# Patient Record
Sex: FEMALE | Race: WHITE | NOT HISPANIC OR LATINO | Employment: FULL TIME | URBAN - METROPOLITAN AREA
[De-identification: names, ages, dates, MRNs, and addresses within clinical notes are randomized per-mention and may not be internally consistent; named-entity substitution may affect disease eponyms.]

---

## 2023-11-14 ENCOUNTER — OFFICE VISIT (OUTPATIENT)
Dept: OBSTETRICS AND GYNECOLOGY | Facility: CLINIC | Age: 38
End: 2023-11-14
Payer: MEDICAID

## 2023-11-14 ENCOUNTER — LAB VISIT (OUTPATIENT)
Dept: LAB | Facility: CLINIC | Age: 38
End: 2023-11-14
Payer: MEDICAID

## 2023-11-14 VITALS
BODY MASS INDEX: 40.32 KG/M2 | DIASTOLIC BLOOD PRESSURE: 88 MMHG | HEIGHT: 59 IN | SYSTOLIC BLOOD PRESSURE: 136 MMHG | WEIGHT: 200 LBS

## 2023-11-14 DIAGNOSIS — N92.0 MENORRHAGIA WITH REGULAR CYCLE: ICD-10-CM

## 2023-11-14 DIAGNOSIS — N83.202 LEFT OVARIAN CYST: Primary | ICD-10-CM

## 2023-11-14 DIAGNOSIS — D50.0 IRON DEFICIENCY ANEMIA DUE TO CHRONIC BLOOD LOSS: ICD-10-CM

## 2023-11-14 DIAGNOSIS — Z12.4 PAP SMEAR FOR CERVICAL CANCER SCREENING: ICD-10-CM

## 2023-11-14 PROCEDURE — 99203 PR OFFICE/OUTPT VISIT, NEW, LEVL III, 30-44 MIN: ICD-10-PCS | Mod: S$GLB,,, | Performed by: OBSTETRICS & GYNECOLOGY

## 2023-11-14 PROCEDURE — 1159F MED LIST DOCD IN RCRD: CPT | Mod: CPTII,S$GLB,, | Performed by: OBSTETRICS & GYNECOLOGY

## 2023-11-14 PROCEDURE — 1159F PR MEDICATION LIST DOCUMENTED IN MEDICAL RECORD: ICD-10-PCS | Mod: CPTII,S$GLB,, | Performed by: OBSTETRICS & GYNECOLOGY

## 2023-11-14 PROCEDURE — 99203 OFFICE O/P NEW LOW 30 MIN: CPT | Mod: S$GLB,,, | Performed by: OBSTETRICS & GYNECOLOGY

## 2023-11-14 PROCEDURE — 3075F PR MOST RECENT SYSTOLIC BLOOD PRESS GE 130-139MM HG: ICD-10-PCS | Mod: CPTII,S$GLB,, | Performed by: OBSTETRICS & GYNECOLOGY

## 2023-11-14 PROCEDURE — 3079F DIAST BP 80-89 MM HG: CPT | Mod: CPTII,S$GLB,, | Performed by: OBSTETRICS & GYNECOLOGY

## 2023-11-14 PROCEDURE — 3008F BODY MASS INDEX DOCD: CPT | Mod: CPTII,S$GLB,, | Performed by: OBSTETRICS & GYNECOLOGY

## 2023-11-14 PROCEDURE — 3075F SYST BP GE 130 - 139MM HG: CPT | Mod: CPTII,S$GLB,, | Performed by: OBSTETRICS & GYNECOLOGY

## 2023-11-14 PROCEDURE — 3079F PR MOST RECENT DIASTOLIC BLOOD PRESSURE 80-89 MM HG: ICD-10-PCS | Mod: CPTII,S$GLB,, | Performed by: OBSTETRICS & GYNECOLOGY

## 2023-11-14 PROCEDURE — 3008F PR BODY MASS INDEX (BMI) DOCUMENTED: ICD-10-PCS | Mod: CPTII,S$GLB,, | Performed by: OBSTETRICS & GYNECOLOGY

## 2023-11-14 PROCEDURE — 1160F PR REVIEW ALL MEDS BY PRESCRIBER/CLIN PHARMACIST DOCUMENTED: ICD-10-PCS | Mod: CPTII,S$GLB,, | Performed by: OBSTETRICS & GYNECOLOGY

## 2023-11-14 PROCEDURE — 88175 CYTOPATH C/V AUTO FLUID REDO: CPT | Performed by: OBSTETRICS & GYNECOLOGY

## 2023-11-14 PROCEDURE — 1160F RVW MEDS BY RX/DR IN RCRD: CPT | Mod: CPTII,S$GLB,, | Performed by: OBSTETRICS & GYNECOLOGY

## 2023-11-14 PROCEDURE — 85025 COMPLETE CBC W/AUTO DIFF WBC: CPT | Performed by: OBSTETRICS & GYNECOLOGY

## 2023-11-14 RX ORDER — NORGESTIMATE AND ETHINYL ESTRADIOL 0.25-0.035
1 KIT ORAL DAILY
Qty: 90 TABLET | Refills: 3 | Status: SHIPPED | OUTPATIENT
Start: 2023-11-14 | End: 2024-02-04 | Stop reason: SDUPTHER

## 2023-11-14 RX ORDER — NAPROXEN 500 MG/1
500 TABLET ORAL
COMMUNITY
Start: 2023-11-11

## 2023-11-14 NOTE — LETTER
December 11, 2023      [facility]  [address]  [place] [address]  Phone: [phone]  Fax: [phone]       Patient: Yenny Willoughby   YOB: 1985  Date of Visit: 12/11/2023    To Whom It May Concern:    SUMANTH Willoughby  was at [facility] on 12/11/2023. The patient may return to work/school on 12/12/2023 with no restrictions. If you have any questions or concerns, or if I can be of further assistance, please do not hesitate to contact me.    Sincerely,    Kalani De Souza LPN

## 2023-11-14 NOTE — PROGRESS NOTES
Yenny Willoughby  complains of  severe pelvic pain, and heavy menstrual cycles    Pelvic pain/left ovarian cyst   She had an ultrasound on 2023 for pelvic pain and was found to have a 5.5 cm left ovarian cyst.  In addition she was found to have a left inguinal and periumbilical hernias    2.  She is also known to be iron-deficiency anemic due to chronic blood loss, heavy menstrual cycles. In fact she had an iron infusion today     Past Medical History:   Diagnosis Date    Anemia     currently taking iron infusions    Asthma     Heavy menstrual bleeding     Hypertension     no meds    Mental disorder     anxiety/depression     Past Surgical History:   Procedure Laterality Date    CARPAL TUNNEL RELEASE Bilateral     TUBAL LIGATION      PPTL     Family History   Problem Relation Age of Onset    Breast cancer Neg Hx     Colon cancer Neg Hx     Ovarian cancer Neg Hx     Uterine cancer Neg Hx      Review of patient's allergies indicates:  No Known Allergies  Social History     Socioeconomic History    Marital status: Single   Tobacco Use    Smoking status: Every Day     Types: Cigarettes    Smokeless tobacco: Never   Substance and Sexual Activity    Alcohol use: Not Currently    Sexual activity: Yes       ROS:   See HPI    Vitals:    23 1352   BP: 136/88     GENERAL: mild distress, crying  NECK: thyroid is normal in size without nodules or tenderness  BREASTS: inspection negative, no nipple discharge or bleeding, no masses or nodularity palpable  ABDOMEN: Abdomen soft, nontender. BS normal. No masses, organomegaly or scars.  GENITALIA: normal external genitalia, no erythema, no discharge  URETHRA: normal appearing urethra with no masses, tenderness or lesions  VAGINA: normal vagina  CERVIX: Normal  UTERUS: normal size  ADNEXA: not evaluated      Yenny was seen today for ovarian cyst.    Diagnoses and all orders for this visit:    Left ovarian cyst  -     US OB/GYN Procedure (Viewpoint); Future  -      norgestimate-ethinyl estradioL (ORTHO-CYCLEN) 0.25-35 mg-mcg per tablet; Take 1 tablet by mouth once daily.    Iron deficiency anemia due to chronic blood loss    Menorrhagia with regular cycle  -     US OB/GYN Procedure (Viewpoint); Future  -     norgestimate-ethinyl estradioL (ORTHO-CYCLEN) 0.25-35 mg-mcg per tablet; Take 1 tablet by mouth once daily.    Pap smear for cervical cancer screening  -     Liquid-Based Pap Smear, Screening; Future  -     Liquid-Based Pap Smear, Screening         Assessment and plan:    1. Left ovarian cyst, 5.5 cm  2. Pelvic pain  3. Menorrhagia with severe iron-deficiency anemia requiring iron infusions    Discussed options for medical management before surgical options are considered  Declines Depo-Provera  Agrees to try OCPs, start now  Check CBC  Follow-up in 6 weeks with pelvic ultrasound

## 2023-11-14 NOTE — LETTER
November 14, 2023      [facility]  [address]  [place] [address]  Phone: [phone]  Fax: [phone]       Patient: Yenny Willoughby   YOB: 1985  Date of Visit: 11/14/2023    To Whom It May Concern:    SUMANTH Willoughby  was at [facility] on 11/14/2023. The patient may return to work/school on 11/14/2023 with restrictions of no lifting, straining or strenuous activities until further notice . If you have any questions or concerns, or if I can be of further assistance, please do not hesitate to contact me.    Sincerely,    CINDY Gatica Dr.

## 2023-11-15 LAB
BASOPHILS # BLD AUTO: 0.02 K/UL (ref 0–0.2)
BASOPHILS NFR BLD: 0.2 % (ref 0–1.9)
DIFFERENTIAL METHOD: ABNORMAL
EOSINOPHIL # BLD AUTO: 0 K/UL (ref 0–0.5)
EOSINOPHIL NFR BLD: 0.4 % (ref 0–8)
ERYTHROCYTE [DISTWIDTH] IN BLOOD BY AUTOMATED COUNT: 14.5 % (ref 11.5–14.5)
HCT VFR BLD AUTO: 34.7 % (ref 37–48.5)
HGB BLD-MCNC: 9.8 G/DL (ref 12–16)
IMM GRANULOCYTES # BLD AUTO: 0.05 K/UL (ref 0–0.04)
IMM GRANULOCYTES NFR BLD AUTO: 0.5 % (ref 0–0.5)
LYMPHOCYTES # BLD AUTO: 1.3 K/UL (ref 1–4.8)
LYMPHOCYTES NFR BLD: 12.5 % (ref 18–48)
MCH RBC QN AUTO: 21.5 PG (ref 27–31)
MCHC RBC AUTO-ENTMCNC: 28.2 G/DL (ref 32–36)
MCV RBC AUTO: 76 FL (ref 82–98)
MONOCYTES # BLD AUTO: 0.6 K/UL (ref 0.3–1)
MONOCYTES NFR BLD: 6.1 % (ref 4–15)
NEUTROPHILS # BLD AUTO: 8.4 K/UL (ref 1.8–7.7)
NEUTROPHILS NFR BLD: 80.3 % (ref 38–73)
NRBC BLD-RTO: 0 /100 WBC
PLATELET # BLD AUTO: 335 K/UL (ref 150–450)
PMV BLD AUTO: 11.2 FL (ref 9.2–12.9)
RBC # BLD AUTO: 4.56 M/UL (ref 4–5.4)
WBC # BLD AUTO: 10.39 K/UL (ref 3.9–12.7)

## 2023-11-15 PROCEDURE — 36415 COLL VENOUS BLD VENIPUNCTURE: CPT | Mod: ,,, | Performed by: STUDENT IN AN ORGANIZED HEALTH CARE EDUCATION/TRAINING PROGRAM

## 2023-11-15 PROCEDURE — 36415 PR COLLECTION VENOUS BLOOD,VENIPUNCTURE: ICD-10-PCS | Mod: ,,, | Performed by: STUDENT IN AN ORGANIZED HEALTH CARE EDUCATION/TRAINING PROGRAM

## 2023-11-22 ENCOUNTER — TELEPHONE (OUTPATIENT)
Dept: OBSTETRICS AND GYNECOLOGY | Facility: CLINIC | Age: 38
End: 2023-11-22
Payer: MEDICAID

## 2023-11-22 NOTE — TELEPHONE ENCOUNTER
----- Message from Charla Langley MD sent at 11/16/2023 12:25 PM CST -----  Continue birth control pills and iron and follow-up as planned for ultrasound

## 2023-11-24 LAB
FINAL PATHOLOGIC DIAGNOSIS: NORMAL
Lab: NORMAL

## 2023-12-11 ENCOUNTER — TELEPHONE (OUTPATIENT)
Dept: OBSTETRICS AND GYNECOLOGY | Facility: CLINIC | Age: 38
End: 2023-12-11
Payer: MEDICAID

## 2023-12-11 NOTE — TELEPHONE ENCOUNTER
----- Message from Lucy Devine sent at 12/11/2023  2:17 PM CST -----  Contact: PT  Type:  Needs Medical Advice    Who Called: PT   Symptoms (please be specific): PT NEEDS A CALL BACK TO DISCUSS LIFTING HER RESTRICTION SO THAT SHE CAN GO BACK TO WORK.   How long has patient had these symptoms:  A FEW WEEKS   Pharmacy name and phone #:    [id number] - [place], [place] - [address]  [address]  [place] [address]  Phone: [phone] Fax: [phone]  Would the patient rather a call back or a response via [facility]? CALL   Best Call Back Number: [phone]  Additional Information: THANK YOU

## 2023-12-19 ENCOUNTER — PROCEDURE VISIT (OUTPATIENT)
Dept: OBSTETRICS AND GYNECOLOGY | Facility: CLINIC | Age: 38
End: 2023-12-19
Payer: MEDICAID

## 2023-12-19 ENCOUNTER — OFFICE VISIT (OUTPATIENT)
Dept: OBSTETRICS AND GYNECOLOGY | Facility: CLINIC | Age: 38
End: 2023-12-19
Payer: MEDICAID

## 2023-12-19 ENCOUNTER — LAB VISIT (OUTPATIENT)
Dept: LAB | Facility: CLINIC | Age: 38
End: 2023-12-19
Payer: MEDICAID

## 2023-12-19 DIAGNOSIS — N83.201 RIGHT OVARIAN CYST: ICD-10-CM

## 2023-12-19 DIAGNOSIS — N83.202 LEFT OVARIAN CYST: ICD-10-CM

## 2023-12-19 DIAGNOSIS — N92.0 MENORRHAGIA WITH REGULAR CYCLE: ICD-10-CM

## 2023-12-19 DIAGNOSIS — D50.0 IRON DEFICIENCY ANEMIA DUE TO CHRONIC BLOOD LOSS: ICD-10-CM

## 2023-12-19 DIAGNOSIS — N92.0 MENORRHAGIA WITH REGULAR CYCLE: Primary | ICD-10-CM

## 2023-12-19 LAB
BASOPHILS # BLD AUTO: 0.02 K/UL (ref 0–0.2)
BASOPHILS NFR BLD: 0.2 % (ref 0–1.9)
DIFFERENTIAL METHOD: ABNORMAL
EOSINOPHIL # BLD AUTO: 0.1 K/UL (ref 0–0.5)
EOSINOPHIL NFR BLD: 0.8 % (ref 0–8)
ERYTHROCYTE [DISTWIDTH] IN BLOOD BY AUTOMATED COUNT: 19.3 % (ref 11.5–14.5)
HCT VFR BLD AUTO: 40 % (ref 37–48.5)
HGB BLD-MCNC: 12.1 G/DL (ref 12–16)
IMM GRANULOCYTES # BLD AUTO: 0.04 K/UL (ref 0–0.04)
IMM GRANULOCYTES NFR BLD AUTO: 0.4 % (ref 0–0.5)
LYMPHOCYTES # BLD AUTO: 1.7 K/UL (ref 1–4.8)
LYMPHOCYTES NFR BLD: 19.2 % (ref 18–48)
MCH RBC QN AUTO: 24.6 PG (ref 27–31)
MCHC RBC AUTO-ENTMCNC: 30.3 G/DL (ref 32–36)
MCV RBC AUTO: 82 FL (ref 82–98)
MONOCYTES # BLD AUTO: 0.5 K/UL (ref 0.3–1)
MONOCYTES NFR BLD: 5.1 % (ref 4–15)
NEUTROPHILS # BLD AUTO: 6.7 K/UL (ref 1.8–7.7)
NEUTROPHILS NFR BLD: 74.3 % (ref 38–73)
NRBC BLD-RTO: 0 /100 WBC
PLATELET # BLD AUTO: 271 K/UL (ref 150–450)
PMV BLD AUTO: 10.8 FL (ref 9.2–12.9)
RBC # BLD AUTO: 4.91 M/UL (ref 4–5.4)
WBC # BLD AUTO: 8.99 K/UL (ref 3.9–12.7)

## 2023-12-19 PROCEDURE — 76856 US OB/GYN PROCEDURE (VIEWPOINT): ICD-10-PCS | Mod: S$GLB,,, | Performed by: OBSTETRICS & GYNECOLOGY

## 2023-12-19 PROCEDURE — 1160F RVW MEDS BY RX/DR IN RCRD: CPT | Mod: CPTII,S$GLB,, | Performed by: OBSTETRICS & GYNECOLOGY

## 2023-12-19 PROCEDURE — 36415 PR COLLECTION VENOUS BLOOD,VENIPUNCTURE: ICD-10-PCS | Mod: ,,, | Performed by: OBSTETRICS & GYNECOLOGY

## 2023-12-19 PROCEDURE — 76856 US EXAM PELVIC COMPLETE: CPT | Mod: S$GLB,,, | Performed by: OBSTETRICS & GYNECOLOGY

## 2023-12-19 PROCEDURE — 36415 COLL VENOUS BLD VENIPUNCTURE: CPT | Mod: ,,, | Performed by: OBSTETRICS & GYNECOLOGY

## 2023-12-19 PROCEDURE — 1159F MED LIST DOCD IN RCRD: CPT | Mod: CPTII,S$GLB,, | Performed by: OBSTETRICS & GYNECOLOGY

## 2023-12-19 PROCEDURE — 1160F PR REVIEW ALL MEDS BY PRESCRIBER/CLIN PHARMACIST DOCUMENTED: ICD-10-PCS | Mod: CPTII,S$GLB,, | Performed by: OBSTETRICS & GYNECOLOGY

## 2023-12-19 PROCEDURE — 99214 OFFICE O/P EST MOD 30 MIN: CPT | Mod: S$GLB,,, | Performed by: OBSTETRICS & GYNECOLOGY

## 2023-12-19 PROCEDURE — 99214 PR OFFICE/OUTPT VISIT, EST, LEVL IV, 30-39 MIN: ICD-10-PCS | Mod: S$GLB,,, | Performed by: OBSTETRICS & GYNECOLOGY

## 2023-12-19 PROCEDURE — 1159F PR MEDICATION LIST DOCUMENTED IN MEDICAL RECORD: ICD-10-PCS | Mod: CPTII,S$GLB,, | Performed by: OBSTETRICS & GYNECOLOGY

## 2023-12-19 PROCEDURE — 85025 COMPLETE CBC W/AUTO DIFF WBC: CPT | Performed by: OBSTETRICS & GYNECOLOGY

## 2023-12-19 NOTE — PROGRESS NOTES
Yenny Willoughby   is here for follow-up.  She was seen 1 month ago  Yenny Willoughby  complains of  severe pelvic pain, and heavy menstrual cycles     Pelvic pain/left ovarian cyst   She had an ultrasound on 2023 for pelvic pain and was found to have a 5.5 cm left ovarian cyst.  In addition she was found to have a left inguinal and periumbilical hernias     2.  She is also known to be iron-deficiency anemic due to chronic blood loss, heavy menstrual cycles.  She receives iron infusions for her anemia    She was started on OCPs 6 weeks ago to try to control her bleeding and to help the cyst resolved.  She states that her periods are better, less heavy unless crampy    Past Medical History:   Diagnosis Date    Anemia     currently taking iron infusions    Asthma     Heavy menstrual bleeding     Hypertension     no meds    Mental disorder     anxiety/depression     Past Surgical History:   Procedure Laterality Date    CARPAL TUNNEL RELEASE Bilateral     TUBAL LIGATION      PPTL     Family History   Problem Relation Age of Onset    Breast cancer Neg Hx     Colon cancer Neg Hx     Ovarian cancer Neg Hx     Uterine cancer Neg Hx      Review of patient's allergies indicates:  No Known Allergies  Social History     Socioeconomic History    Marital status: Single   Tobacco Use    Smoking status: Every Day     Types: Cigarettes    Smokeless tobacco: Never   Substance and Sexual Activity    Alcohol use: Not Currently    Sexual activity: Yes       ROS:   See HPI    There were no vitals filed for this visit.     Ultrasound today shows the uterus to be 10 cm in length, with an endometrial thickness of 1 cm.  Both ovaries are normal.  The previous left ovarian cyst has resolved    Yenny was seen today for follow-up.    Diagnoses and all orders for this visit:    Menorrhagia with regular cycle    Iron deficiency anemia due to chronic blood loss  -     CBC Auto Differential; Future    Right ovarian cyst          Assessment and plan:  Resolution of ovarian cyst  Improvement of menorrhagia and dysmenorrhea on OCPs  Continue OCPs and follow-up in 3 months  CBC today

## 2023-12-20 ENCOUNTER — TELEPHONE (OUTPATIENT)
Dept: OBSTETRICS AND GYNECOLOGY | Facility: CLINIC | Age: 38
End: 2023-12-20
Payer: MEDICAID

## 2023-12-20 NOTE — TELEPHONE ENCOUNTER
----- Message from Charla Langley MD sent at 12/19/2023  4:02 PM CST -----  Her blood count is much better.  Continue current treatment with OCPs and follow-up in 3 months

## 2023-12-27 ENCOUNTER — TELEPHONE (OUTPATIENT)
Dept: OBSTETRICS AND GYNECOLOGY | Facility: CLINIC | Age: 38
End: 2023-12-27
Payer: MEDICAID

## 2023-12-27 NOTE — TELEPHONE ENCOUNTER
----- Message from Johanna Glez sent at 12/27/2023 10:23 AM CST -----  Contact: PT  Type: Needs Medical Advice    Who Called: PT  Best Call Back Number: [phone]  Additional  Information: PT requesting a call back have some questions regarding irregular cycle started and she started birth control 2 months ago  Please Advise- Thank you

## 2023-12-29 ENCOUNTER — TELEPHONE (OUTPATIENT)
Dept: OBSTETRICS AND GYNECOLOGY | Facility: CLINIC | Age: 38
End: 2023-12-29
Payer: MEDICAID

## 2023-12-29 NOTE — TELEPHONE ENCOUNTER
----- Message from Jennifer Anne sent at 12/29/2023  9:53 AM CST -----  Contact: pt  Type: Needs Medical Advice         Who Called: pt  Best Call Back Number:[phone]  Additional Information: Requesting a call back regarding  pt returned your call asking for  call back please   Please Advise- Thank you

## 2024-02-04 DIAGNOSIS — N92.0 MENORRHAGIA WITH REGULAR CYCLE: ICD-10-CM

## 2024-02-04 DIAGNOSIS — N83.202 LEFT OVARIAN CYST: ICD-10-CM

## 2024-02-05 RX ORDER — NORGESTIMATE AND ETHINYL ESTRADIOL 0.25-0.035
1 KIT ORAL DAILY
Qty: 90 TABLET | Refills: 3 | Status: SHIPPED | OUTPATIENT
Start: 2024-02-05 | End: 2025-02-04

## 2024-04-09 ENCOUNTER — TELEPHONE (OUTPATIENT)
Dept: OBSTETRICS AND GYNECOLOGY | Facility: CLINIC | Age: 39
End: 2024-04-09

## 2024-04-09 NOTE — TELEPHONE ENCOUNTER
----- Message from Lucy Nilson sent at 4/9/2024 12:42 PM CDT -----  Contact: PT  Type: SWITCH   APPT  TYPE    Who Called: PT   Would the patient rather a call back or a response via [facility]? CALL   Best Call Back Number:  [phone]  Additional Information: PT HAS A 1PM APPT TODAY BUT IS UNABLE TO COME IN   PT WOULD LIKE TO KNOW IF THE APPT CAN BE SWITCH TO A TELEHEALTH APPT  PT DOES NOT KNOW HOW TO LOGIN FOR A VIRTUAL  PLEASE CALL TO CONFIRM   THANK YOU

## 2024-09-10 ENCOUNTER — OFFICE VISIT (OUTPATIENT)
Dept: OBSTETRICS AND GYNECOLOGY | Facility: CLINIC | Age: 39
End: 2024-09-10
Payer: MEDICAID

## 2024-09-10 VITALS
SYSTOLIC BLOOD PRESSURE: 126 MMHG | BODY MASS INDEX: 37.5 KG/M2 | DIASTOLIC BLOOD PRESSURE: 82 MMHG | WEIGHT: 186 LBS | HEIGHT: 59 IN

## 2024-09-10 DIAGNOSIS — N93.9 ABNORMAL UTERINE BLEEDING: Primary | ICD-10-CM

## 2024-09-10 DIAGNOSIS — N92.0 MENORRHAGIA WITH REGULAR CYCLE: ICD-10-CM

## 2024-09-10 DIAGNOSIS — N92.1 BREAKTHROUGH BLEEDING ON BIRTH CONTROL PILLS: ICD-10-CM

## 2024-09-10 PROCEDURE — 3074F SYST BP LT 130 MM HG: CPT | Mod: CPTII,,, | Performed by: OBSTETRICS & GYNECOLOGY

## 2024-09-10 PROCEDURE — 99999 PR PBB SHADOW E&M-EST. PATIENT-LVL III: CPT | Mod: PBBFAC,,, | Performed by: OBSTETRICS & GYNECOLOGY

## 2024-09-10 PROCEDURE — 99214 OFFICE O/P EST MOD 30 MIN: CPT | Mod: S$PBB,,, | Performed by: OBSTETRICS & GYNECOLOGY

## 2024-09-10 PROCEDURE — 4010F ACE/ARB THERAPY RXD/TAKEN: CPT | Mod: CPTII,,, | Performed by: OBSTETRICS & GYNECOLOGY

## 2024-09-10 PROCEDURE — 3079F DIAST BP 80-89 MM HG: CPT | Mod: CPTII,,, | Performed by: OBSTETRICS & GYNECOLOGY

## 2024-09-10 PROCEDURE — 99213 OFFICE O/P EST LOW 20 MIN: CPT | Mod: PBBFAC | Performed by: OBSTETRICS & GYNECOLOGY

## 2024-09-10 PROCEDURE — 3008F BODY MASS INDEX DOCD: CPT | Mod: CPTII,,, | Performed by: OBSTETRICS & GYNECOLOGY

## 2024-09-10 PROCEDURE — 1159F MED LIST DOCD IN RCRD: CPT | Mod: CPTII,,, | Performed by: OBSTETRICS & GYNECOLOGY

## 2024-09-10 RX ORDER — BUPROPION HYDROCHLORIDE 150 MG/1
150 TABLET ORAL
COMMUNITY
Start: 2024-08-08

## 2024-09-10 RX ORDER — ALBUTEROL SULFATE 90 UG/1
2 INHALANT RESPIRATORY (INHALATION) EVERY 6 HOURS PRN
COMMUNITY
Start: 2024-03-21

## 2024-09-10 RX ORDER — LISINOPRIL 10 MG/1
10 TABLET ORAL
COMMUNITY

## 2024-09-10 NOTE — H&P (VIEW-ONLY)
Gynecology Preoperative Exam  History & Physical      SUBJECTIVE:     History of Present Illness:  Patient is a 39 y.o. female presents complaining of continued menorrhagia despite taking OCPs.  He is interested in endometrial ablation.  She is status post tubal ligation.    Chief Complaint   Patient presents with    Bleeding/Bruising       Review of patient's allergies indicates:   Allergen Reactions    Prednisone Hives       Current Outpatient Medications   Medication Sig Dispense Refill    albuterol (PROVENTIL/VENTOLIN HFA) 90 mcg/actuation inhaler Inhale 2 puffs into the lungs every 6 (six) hours as needed.      buPROPion (WELLBUTRIN XL) 150 MG TB24 tablet Take 150 mg by mouth.      lisinopriL 10 MG tablet Take 10 mg by mouth.      norgestimate-ethinyl estradioL (ORTHO-CYCLEN) 0.25-35 mg-mcg per tablet Take 1 tablet by mouth once daily. 90 tablet 3    naproxen (NAPROSYN) 500 MG tablet Take 500 mg by mouth. (Patient not taking: Reported on 9/10/2024)       No current facility-administered medications for this visit.     OB History          6    Para   5    Term   5            AB   1    Living   5         SAB   1    IAB        Ectopic        Multiple        Live Births                 Patient's last menstrual period was 08/10/2024.      Past Medical History:   Diagnosis Date    Anemia     currently taking iron infusions    Asthma     Heavy menstrual bleeding     Hypertension     no meds    Mental disorder     anxiety/depression     Past Surgical History:   Procedure Laterality Date    CARPAL TUNNEL RELEASE Bilateral     TUBAL LIGATION      PPTL     Family History   Problem Relation Name Age of Onset    Breast cancer Neg Hx      Colon cancer Neg Hx      Ovarian cancer Neg Hx      Uterine cancer Neg Hx       Social History     Tobacco Use    Smoking status: Every Day     Types: Cigarettes    Smokeless tobacco: Never   Substance Use Topics    Alcohol use: Not Currently        OBJECTIVE:     Vital Signs  "(Most Recent)  BP: 126/82 (09/10/24 1311)  4' 11" (1.499 m)  84.4 kg (186 lb)     Physical Exam:  Physical Exam  Vitals reviewed.   Constitutional:       Appearance: Normal appearance.   HENT:      Head: Normocephalic and atraumatic.   Pulmonary:      Effort: Pulmonary effort is normal.   Neurological:      General: No focal deficit present.      Mental Status: She is alert and oriented to person, place, and time.   Psychiatric:         Mood and Affect: Mood normal.         Behavior: Behavior normal.           Labs:   11/7/2023 TSH 1.5  2/15/2024 Hgb 12.3    Pelvic ultrasound performed on 12/19/2023:  Uterus measures 10.4 x 5.4 x 4.6 cm  Endometrial thickness measures 11 mm  Normal-appearing bilateral ovaries    ASSESSMENT/PLAN:       ICD-10-CM ICD-9-CM   1. Abnormal uterine bleeding  N93.9 626.9   2. Menorrhagia with regular cycle  N92.0 626.2   3. Breakthrough bleeding on birth control pills  N92.1 626.6       PLAN:    --Patient has failed medical management of menorrhagia.  --Discussed further treatment options.  She is interested in endometrial ablation.  She is status post bilateral tubal ligation.  --Brochure for NovaSure endometrial ablation given to patient.  --Reviewed the risks, benefits, alternatives, and indications for this procedure including but not limited to pain, bleeding, infection, damage to surrounding organs, 1/100 risk of uterine perforation and need to abort procedure.  --Consent reviewed and signed with patient today.  --Procedure scheduled for October 1.  --Follow up in the office four weeks post op.      Alisia Collins MD, FACOG   Gynecology    [address] [address] [phone]      "

## 2024-09-10 NOTE — PROGRESS NOTES
Gynecology Preoperative Exam  History & Physical      SUBJECTIVE:     History of Present Illness:  Patient is a 39 y.o. female presents complaining of continued menorrhagia despite taking OCPs.  He is interested in endometrial ablation.  She is status post tubal ligation.    Chief Complaint   Patient presents with    Bleeding/Bruising       Review of patient's allergies indicates:   Allergen Reactions    Prednisone Hives       Current Outpatient Medications   Medication Sig Dispense Refill    albuterol (PROVENTIL/VENTOLIN HFA) 90 mcg/actuation inhaler Inhale 2 puffs into the lungs every 6 (six) hours as needed.      buPROPion (WELLBUTRIN XL) 150 MG TB24 tablet Take 150 mg by mouth.      lisinopriL 10 MG tablet Take 10 mg by mouth.      norgestimate-ethinyl estradioL (ORTHO-CYCLEN) 0.25-35 mg-mcg per tablet Take 1 tablet by mouth once daily. 90 tablet 3    naproxen (NAPROSYN) 500 MG tablet Take 500 mg by mouth. (Patient not taking: Reported on 9/10/2024)       No current facility-administered medications for this visit.     OB History          6    Para   5    Term   5            AB   1    Living   5         SAB   1    IAB        Ectopic        Multiple        Live Births                 Patient's last menstrual period was 08/10/2024.      Past Medical History:   Diagnosis Date    Anemia     currently taking iron infusions    Asthma     Heavy menstrual bleeding     Hypertension     no meds    Mental disorder     anxiety/depression     Past Surgical History:   Procedure Laterality Date    CARPAL TUNNEL RELEASE Bilateral     TUBAL LIGATION      PPTL     Family History   Problem Relation Name Age of Onset    Breast cancer Neg Hx      Colon cancer Neg Hx      Ovarian cancer Neg Hx      Uterine cancer Neg Hx       Social History     Tobacco Use    Smoking status: Every Day     Types: Cigarettes    Smokeless tobacco: Never   Substance Use Topics    Alcohol use: Not Currently        OBJECTIVE:     Vital Signs  "(Most Recent)  BP: 126/82 (09/10/24 1311)  4' 11" (1.499 m)  84.4 kg (186 lb)     Physical Exam:  Physical Exam  Vitals reviewed.   Constitutional:       Appearance: Normal appearance.   HENT:      Head: Normocephalic and atraumatic.   Pulmonary:      Effort: Pulmonary effort is normal.   Neurological:      General: No focal deficit present.      Mental Status: She is alert and oriented to person, place, and time.   Psychiatric:         Mood and Affect: Mood normal.         Behavior: Behavior normal.           Labs:   11/7/2023 TSH 1.5  2/15/2024 Hgb 12.3    Pelvic ultrasound performed on 12/19/2023:  Uterus measures 10.4 x 5.4 x 4.6 cm  Endometrial thickness measures 11 mm  Normal-appearing bilateral ovaries    ASSESSMENT/PLAN:       ICD-10-CM ICD-9-CM   1. Abnormal uterine bleeding  N93.9 626.9   2. Menorrhagia with regular cycle  N92.0 626.2   3. Breakthrough bleeding on birth control pills  N92.1 626.6       PLAN:    --Patient has failed medical management of menorrhagia.  --Discussed further treatment options.  She is interested in endometrial ablation.  She is status post bilateral tubal ligation.  --Brochure for NovaSure endometrial ablation given to patient.  --Reviewed the risks, benefits, alternatives, and indications for this procedure including but not limited to pain, bleeding, infection, damage to surrounding organs, 1/100 risk of uterine perforation and need to abort procedure.  --Consent reviewed and signed with patient today.  --Procedure scheduled for October 1.  --Follow up in the office four weeks post op.      Alisia Collins MD, FACOG   Gynecology    [address] [address] [phone]      "

## 2024-09-12 ENCOUNTER — ANESTHESIA EVENT (OUTPATIENT)
Dept: SURGERY | Facility: HOSPITAL | Age: 39
End: 2024-09-12
Payer: MEDICAID

## 2024-09-12 NOTE — ANESTHESIA PREPROCEDURE EVALUATION
2024  Yenny Willoughby is a 39 y.o., female.      Pre-op Assessment    I have reviewed the Patient Summary Reports.     I have reviewed the Nursing Notes. I have reviewed the NPO Status.   I have reviewed the Medications.     Review of Systems  Anesthesia Hx:  No problems with previous Anesthesia  PP BTL  CTR-R           Denies Family Hx of Anesthesia complications.    Denies Personal Hx of Anesthesia complications.                    Social:  Smoker Outside record   ppd x 20      Hematology/Oncology:       -- Anemia (h/o anemia, on Fe infusions.):               Hematology Comments: Hct 39.8  PMB                    Cardiovascular:     Hypertension              ECG has been reviewed. No meds  EK   otherwise NL.  No change old EKGs  outside record                         Renal/:  Renal/ Normal                 Hepatic/GI:  Hepatic/GI Normal                 Musculoskeletal:  Musculoskeletal Normal                Neurological:  Neurology Normal                                      Endocrine:  Endocrine Normal    BMI 38  On semiglutide        Dermatological:  Skin Normal    Psych:  Psychiatric History anxiety               Physical Exam  General: Well nourished, Cooperative and Alert    Airway:  Mallampati: II   Mouth Opening: Small, but > 3cm  TM Distance: 4 - 6 cm  Tongue: Normal  Neck ROM: Normal ROM    Dental:  Intact    Chest/Lungs:  Clear to auscultation    Heart:  Rate: Normal  Rhythm: Regular Rhythm    Anesthesia Plan  Type of Anesthesia, risks & benefits discussed:    Anesthesia Type: Gen Supraglottic Airway  Intra-op Monitoring Plan: Standard ASA Monitors  Post Op Pain Control Plan: multimodal analgesia  Induction:  IV  Informed Consent: Informed consent signed with the Patient and all parties understand the risks and agree with anesthesia plan.  All questions answered.   ASA Score:  3  Day of Surgery Review of History & Physical: H&P Update referred to the surgeon/provider.    Ready For Surgery From Anesthesia Perspective.   .

## 2024-09-30 ENCOUNTER — LAB VISIT (OUTPATIENT)
Dept: LAB | Facility: HOSPITAL | Age: 39
End: 2024-09-30
Attending: OBSTETRICS & GYNECOLOGY
Payer: MEDICAID

## 2024-09-30 DIAGNOSIS — N92.1 BREAKTHROUGH BLEEDING ON BIRTH CONTROL PILLS: ICD-10-CM

## 2024-09-30 DIAGNOSIS — N93.9 ABNORMAL UTERINE BLEEDING: ICD-10-CM

## 2024-09-30 DIAGNOSIS — N92.0 MENORRHAGIA WITH REGULAR CYCLE: ICD-10-CM

## 2024-09-30 LAB
ANION GAP SERPL CALC-SCNC: 11 MMOL/L (ref 8–16)
BUN SERPL-MCNC: 12 MG/DL (ref 6–20)
CALCIUM SERPL-MCNC: 9.5 MG/DL (ref 8.7–10.5)
CHLORIDE SERPL-SCNC: 107 MMOL/L (ref 95–110)
CO2 SERPL-SCNC: 24 MMOL/L (ref 23–29)
CREAT SERPL-MCNC: 0.9 MG/DL (ref 0.5–1.4)
ERYTHROCYTE [DISTWIDTH] IN BLOOD BY AUTOMATED COUNT: 12.9 % (ref 11.5–14.5)
EST. GFR  (NO RACE VARIABLE): >60 ML/MIN/1.73 M^2
GLUCOSE SERPL-MCNC: 101 MG/DL (ref 70–110)
HCT VFR BLD AUTO: 42.7 % (ref 37–48.5)
HGB BLD-MCNC: 13.4 G/DL (ref 12–16)
MCH RBC QN AUTO: 28.1 PG (ref 27–31)
MCHC RBC AUTO-ENTMCNC: 31.4 G/DL (ref 32–36)
MCV RBC AUTO: 90 FL (ref 82–98)
PLATELET # BLD AUTO: 301 K/UL (ref 150–450)
PMV BLD AUTO: 10.4 FL (ref 9.2–12.9)
POTASSIUM SERPL-SCNC: 4.1 MMOL/L (ref 3.5–5.1)
RBC # BLD AUTO: 4.77 M/UL (ref 4–5.4)
SODIUM SERPL-SCNC: 142 MMOL/L (ref 136–145)
WBC # BLD AUTO: 9.27 K/UL (ref 3.9–12.7)

## 2024-09-30 PROCEDURE — 80048 BASIC METABOLIC PNL TOTAL CA: CPT | Performed by: OBSTETRICS & GYNECOLOGY

## 2024-09-30 PROCEDURE — 36415 COLL VENOUS BLD VENIPUNCTURE: CPT | Performed by: OBSTETRICS & GYNECOLOGY

## 2024-09-30 PROCEDURE — 85027 COMPLETE CBC AUTOMATED: CPT | Performed by: OBSTETRICS & GYNECOLOGY

## 2024-10-01 ENCOUNTER — HOSPITAL ENCOUNTER (OUTPATIENT)
Facility: HOSPITAL | Age: 39
Discharge: HOME | End: 2024-10-01
Attending: OBSTETRICS & GYNECOLOGY | Admitting: OBSTETRICS & GYNECOLOGY
Payer: MEDICAID

## 2024-10-01 ENCOUNTER — ANESTHESIA (OUTPATIENT)
Dept: SURGERY | Facility: HOSPITAL | Age: 39
End: 2024-10-01
Payer: MEDICAID

## 2024-10-01 VITALS
HEIGHT: 59 IN | SYSTOLIC BLOOD PRESSURE: 111 MMHG | TEMPERATURE: 98 F | WEIGHT: 186 LBS | RESPIRATION RATE: 16 BRPM | OXYGEN SATURATION: 97 % | BODY MASS INDEX: 37.5 KG/M2 | HEART RATE: 88 BPM | DIASTOLIC BLOOD PRESSURE: 72 MMHG

## 2024-10-01 DIAGNOSIS — Z98.890 S/P ENDOMETRIAL ABLATION: Primary | ICD-10-CM

## 2024-10-01 PROCEDURE — 58563 HYSTEROSCOPY ABLATION: CPT | Mod: ,,, | Performed by: OBSTETRICS & GYNECOLOGY

## 2024-10-01 PROCEDURE — 63600175 PHARM REV CODE 636 W HCPCS: Performed by: OBSTETRICS & GYNECOLOGY

## 2024-10-01 PROCEDURE — 88305 TISSUE EXAM BY PATHOLOGIST: CPT | Mod: 26,,, | Performed by: PATHOLOGY

## 2024-10-01 PROCEDURE — 36000707: Performed by: OBSTETRICS & GYNECOLOGY

## 2024-10-01 PROCEDURE — 63600175 PHARM REV CODE 636 W HCPCS: Mod: UD | Performed by: NURSE ANESTHETIST, CERTIFIED REGISTERED

## 2024-10-01 PROCEDURE — 27201423 OPTIME MED/SURG SUP & DEVICES STERILE SUPPLY: Performed by: OBSTETRICS & GYNECOLOGY

## 2024-10-01 PROCEDURE — 71000033 HC RECOVERY, INTIAL HOUR: Performed by: OBSTETRICS & GYNECOLOGY

## 2024-10-01 PROCEDURE — 36000706: Performed by: OBSTETRICS & GYNECOLOGY

## 2024-10-01 PROCEDURE — 37000008 HC ANESTHESIA 1ST 15 MINUTES: Performed by: OBSTETRICS & GYNECOLOGY

## 2024-10-01 PROCEDURE — 63600175 PHARM REV CODE 636 W HCPCS: Performed by: ANESTHESIOLOGY

## 2024-10-01 PROCEDURE — 88305 TISSUE EXAM BY PATHOLOGIST: CPT | Performed by: PATHOLOGY

## 2024-10-01 PROCEDURE — 37000009 HC ANESTHESIA EA ADD 15 MINS: Performed by: OBSTETRICS & GYNECOLOGY

## 2024-10-01 PROCEDURE — 25000242 PHARM REV CODE 250 ALT 637 W/ HCPCS: Performed by: NURSE ANESTHETIST, CERTIFIED REGISTERED

## 2024-10-01 PROCEDURE — 71000015 HC POSTOP RECOV 1ST HR: Performed by: OBSTETRICS & GYNECOLOGY

## 2024-10-01 RX ORDER — IBUPROFEN 800 MG/1
800 TABLET ORAL EVERY 8 HOURS PRN
Qty: 60 TABLET | Refills: 2 | Status: SHIPPED | OUTPATIENT
Start: 2024-10-01 | End: 2025-10-01

## 2024-10-01 RX ORDER — ACETAMINOPHEN 10 MG/ML
INJECTION, SOLUTION INTRAVENOUS
Status: DISCONTINUED | OUTPATIENT
Start: 2024-10-01 | End: 2024-10-01

## 2024-10-01 RX ORDER — LIDOCAINE HYDROCHLORIDE 20 MG/ML
INJECTION, SOLUTION EPIDURAL; INFILTRATION; INTRACAUDAL; PERINEURAL
Status: DISCONTINUED | OUTPATIENT
Start: 2024-10-01 | End: 2024-10-01

## 2024-10-01 RX ORDER — ONDANSETRON HYDROCHLORIDE 2 MG/ML
INJECTION, SOLUTION INTRAVENOUS
Status: DISCONTINUED | OUTPATIENT
Start: 2024-10-01 | End: 2024-10-01

## 2024-10-01 RX ORDER — MIDAZOLAM HYDROCHLORIDE 1 MG/ML
INJECTION INTRAMUSCULAR; INTRAVENOUS
Status: DISCONTINUED | OUTPATIENT
Start: 2024-10-01 | End: 2024-10-01

## 2024-10-01 RX ORDER — ONDANSETRON HYDROCHLORIDE 2 MG/ML
4 INJECTION, SOLUTION INTRAVENOUS DAILY PRN
Status: DISCONTINUED | OUTPATIENT
Start: 2024-10-01 | End: 2024-10-01 | Stop reason: HOSPADM

## 2024-10-01 RX ORDER — OXYCODONE HYDROCHLORIDE 5 MG/1
5 TABLET ORAL ONCE AS NEEDED
Status: DISCONTINUED | OUTPATIENT
Start: 2024-10-01 | End: 2024-10-01 | Stop reason: HOSPADM

## 2024-10-01 RX ORDER — HYDROCODONE BITARTRATE AND ACETAMINOPHEN 5; 325 MG/1; MG/1
1 TABLET ORAL EVERY 6 HOURS PRN
Qty: 10 TABLET | Refills: 0 | Status: SHIPPED | OUTPATIENT
Start: 2024-10-01

## 2024-10-01 RX ORDER — PROPOFOL 10 MG/ML
VIAL (ML) INTRAVENOUS
Status: DISCONTINUED | OUTPATIENT
Start: 2024-10-01 | End: 2024-10-01

## 2024-10-01 RX ORDER — MEPERIDINE HYDROCHLORIDE 50 MG/ML
12.5 INJECTION INTRAMUSCULAR; INTRAVENOUS; SUBCUTANEOUS EVERY 10 MIN PRN
Status: DISCONTINUED | OUTPATIENT
Start: 2024-10-01 | End: 2024-10-01 | Stop reason: HOSPADM

## 2024-10-01 RX ORDER — GLUCAGON 1 MG
1 KIT INJECTION
Status: DISCONTINUED | OUTPATIENT
Start: 2024-10-01 | End: 2024-10-01 | Stop reason: HOSPADM

## 2024-10-01 RX ORDER — FENTANYL CITRATE 50 UG/ML
INJECTION, SOLUTION INTRAMUSCULAR; INTRAVENOUS
Status: DISCONTINUED | OUTPATIENT
Start: 2024-10-01 | End: 2024-10-01

## 2024-10-01 RX ORDER — DOCUSATE SODIUM 100 MG/1
100 CAPSULE, LIQUID FILLED ORAL 2 TIMES DAILY PRN
Qty: 30 CAPSULE | Refills: 0 | Status: SHIPPED | OUTPATIENT
Start: 2024-10-01

## 2024-10-01 RX ORDER — HYDROMORPHONE HYDROCHLORIDE 1 MG/ML
0.5 INJECTION, SOLUTION INTRAMUSCULAR; INTRAVENOUS; SUBCUTANEOUS EVERY 5 MIN PRN
Status: DISCONTINUED | OUTPATIENT
Start: 2024-10-01 | End: 2024-10-01 | Stop reason: HOSPADM

## 2024-10-01 RX ORDER — KETOROLAC TROMETHAMINE 30 MG/ML
INJECTION, SOLUTION INTRAMUSCULAR; INTRAVENOUS
Status: DISCONTINUED | OUTPATIENT
Start: 2024-10-01 | End: 2024-10-01

## 2024-10-01 RX ORDER — SODIUM CHLORIDE, SODIUM LACTATE, POTASSIUM CHLORIDE, CALCIUM CHLORIDE 600; 310; 30; 20 MG/100ML; MG/100ML; MG/100ML; MG/100ML
125 INJECTION, SOLUTION INTRAVENOUS CONTINUOUS
Status: DISCONTINUED | OUTPATIENT
Start: 2024-10-01 | End: 2024-10-01 | Stop reason: HOSPADM

## 2024-10-01 RX ORDER — LIDOCAINE HYDROCHLORIDE 10 MG/ML
1 INJECTION, SOLUTION EPIDURAL; INFILTRATION; INTRACAUDAL; PERINEURAL ONCE
Status: DISCONTINUED | OUTPATIENT
Start: 2024-10-01 | End: 2024-10-01 | Stop reason: HOSPADM

## 2024-10-01 RX ORDER — SODIUM CHLORIDE, SODIUM LACTATE, POTASSIUM CHLORIDE, CALCIUM CHLORIDE 600; 310; 30; 20 MG/100ML; MG/100ML; MG/100ML; MG/100ML
INJECTION, SOLUTION INTRAVENOUS CONTINUOUS
Status: DISCONTINUED | OUTPATIENT
Start: 2024-10-01 | End: 2024-10-01 | Stop reason: HOSPADM

## 2024-10-01 RX ORDER — FLUCONAZOLE 2 MG/ML
200 INJECTION, SOLUTION INTRAVENOUS
Status: DISCONTINUED | OUTPATIENT
Start: 2024-10-01 | End: 2024-10-01 | Stop reason: HOSPADM

## 2024-10-01 RX ORDER — ALBUTEROL SULFATE 90 UG/1
INHALANT RESPIRATORY (INHALATION)
Status: DISCONTINUED | OUTPATIENT
Start: 2024-10-01 | End: 2024-10-01

## 2024-10-01 RX ADMIN — HYDROMORPHONE HYDROCHLORIDE 0.5 MG: 1 INJECTION, SOLUTION INTRAMUSCULAR; INTRAVENOUS; SUBCUTANEOUS at 01:10

## 2024-10-01 RX ADMIN — FENTANYL CITRATE 100 MCG: 50 INJECTION INTRAMUSCULAR; INTRAVENOUS at 12:10

## 2024-10-01 RX ADMIN — FLUCONAZOLE 200 MG: 2 INJECTION, SOLUTION INTRAVENOUS at 01:10

## 2024-10-01 RX ADMIN — PROPOFOL 200 MG: 10 INJECTION, EMULSION INTRAVENOUS at 12:10

## 2024-10-01 RX ADMIN — MIDAZOLAM HYDROCHLORIDE 2 MG: 1 INJECTION, SOLUTION INTRAMUSCULAR; INTRAVENOUS at 12:10

## 2024-10-01 RX ADMIN — KETOROLAC TROMETHAMINE 30 MG: 30 INJECTION, SOLUTION INTRAMUSCULAR at 01:10

## 2024-10-01 RX ADMIN — LIDOCAINE HYDROCHLORIDE 100 MG: 20 INJECTION, SOLUTION EPIDURAL; INFILTRATION; INTRACAUDAL; PERINEURAL at 12:10

## 2024-10-01 RX ADMIN — ALBUTEROL SULFATE 2 PUFF: 90 AEROSOL, METERED RESPIRATORY (INHALATION) at 12:10

## 2024-10-01 RX ADMIN — ACETAMINOPHEN 1000 MG: 10 INJECTION INTRAVENOUS at 12:10

## 2024-10-01 RX ADMIN — SODIUM CHLORIDE, POTASSIUM CHLORIDE, SODIUM LACTATE AND CALCIUM CHLORIDE: 600; 310; 30; 20 INJECTION, SOLUTION INTRAVENOUS at 11:10

## 2024-10-01 RX ADMIN — ALBUTEROL SULFATE 2 PUFF: 90 AEROSOL, METERED RESPIRATORY (INHALATION) at 01:10

## 2024-10-01 RX ADMIN — ONDANSETRON 4 MG: 2 INJECTION INTRAMUSCULAR; INTRAVENOUS at 12:10

## 2024-10-01 NOTE — OP NOTE
OP NOTE: Novasure Endometrial Ablation    Yenny Willoughby  1985    Procedure:   Novasure endometrial ablation  D&C  Diagnostic hysteroscopy    Surgeon: Alisia Collins  10/01/2024      Preop diagnosis:   Menorrhagia  Abnormal uterine bleeding      Postop diagnosis:   1.Menorrhagia  2.Abnormal uterine bleeding      Estimated blood loss: 10 cc  IVF: 600cc LR    Findings: 10 week sized anteverted uterus, normal uterine cavity and bilateral ostea    The patient was prepped and draped in the dorsolithotomy position.  Bladder drained.  A speculum was inserted in the vagina and the anterior lip of the cervix was grasped with a tenaculum.  The cervix was dilated to 21 Fr.  Hysteroscope was placed into the uterus which revealed a normal appearing cavity.     Sharp curettage then performed to be sent to pathology for review. The SureSound was then used to measure the length of the uterine cavity which was 6.5cm. The Novasure device was deployed in the uterus. The width measured 4.0cm. The Novasure passed the cavity assessment and was enabled at 143W for 66sec. All instruments were removed.     All sponge and instrument counts were correct and the patient was taken recovery in stable condition.      Alisia Collins MD, FACOG   Gynecology    [address] [facility]  [address] [place] [address] [phone]

## 2024-10-01 NOTE — INTERVAL H&P NOTE
The patient has been examined and the H&P has been reviewed:    I concur with the findings and no changes have occurred since H&P was written.    Procedure risks, benefits and alternative options discussed and understood by patient/family.          There are no hospital problems to display for this patient.      Alisia Collins MD, FACOG   Gynecology    149 [facility]  [address] [place] [address] [phone]

## 2024-10-01 NOTE — TRANSFER OF CARE
"Anesthesia Transfer of Care Note    Patient: Yenny Willoughby    Procedure(s) Performed: Procedure(s) (LRB):  HYSTEROSCOPY, WITH DILATION AND CURETTAGE OF UTERUS (N/A)  ABLATION, ENDOMETRIUM (N/A)    Patient location: PACU    Anesthesia Type: general    Transport from OR: Transported from OR on room air with adequate spontaneous ventilation    Post pain: adequate analgesia    Post assessment: no apparent anesthetic complications and tolerated procedure well    Post vital signs: stable    Level of consciousness: sedated and responds to stimulation    Nausea/Vomiting: no nausea/vomiting    Complications: none    Transfer of care protocol was followed      Last vitals: Visit Vitals  /80 (BP Location: Right arm, Patient Position: Lying)   Pulse 91   Temp 36.1 °C (97 °F) (Oral)   Resp 15   Ht 4' 11" (1.499 m)   Wt 84.4 kg (186 lb)   LMP 09/16/2024   SpO2 99%   Breastfeeding No   BMI 37.57 kg/m²     "

## 2024-10-01 NOTE — ANESTHESIA POSTPROCEDURE EVALUATION
Anesthesia Post Evaluation    Patient: Yenny Willoughby    Procedure(s) Performed: Procedure(s) (LRB):  HYSTEROSCOPY, WITH DILATION AND CURETTAGE OF UTERUS (N/A)  ABLATION, ENDOMETRIUM (N/A)    Final Anesthesia Type: general      Patient location during evaluation: PACU  Patient participation: Yes- Able to Participate  Level of consciousness: awake and alert and oriented  Post-procedure vital signs: reviewed and stable  Pain management: adequate  Airway patency: patent    PONV status at discharge: No PONV  Anesthetic complications: no      Cardiovascular status: hemodynamically stable  Respiratory status: unassisted, spontaneous ventilation and room air  Hydration status: euvolemic  Follow-up not needed.          Vitals Value Taken Time   /78 10/01/24 1403   Temp 36.6 °C (97.8 °F) 10/01/24 1322   Pulse 68 10/01/24 1406   Resp 13 10/01/24 1406   SpO2 94 % 10/01/24 1406   Vitals shown include unfiled device data.      No case tracking events are documented in the log.      Pain/Chyna Score: Pain Rating Prior to Med Admin: 9 (10/1/2024  1:51 PM)  Chyna Score: 8 (10/1/2024  1:22 PM)

## 2024-10-01 NOTE — PLAN OF CARE
Discharge instructions given to patient and her friend, both verbalized understanding and voiced no questions or concerns at this time.

## 2024-10-01 NOTE — ANESTHESIA PROCEDURE NOTES
Intubation    Date/Time: 10/1/2024 12:44 PM    Performed by: Maricel Boswell CRNA  Authorized by: Sabino Castro MD    Intubation:     Induction:  Intravenous    Intubated:  Postinduction    Mask Ventilation:  Easy mask    Attempts:  1    Attempted By:  CRNA    Difficult Airway Encountered?: No      Complications:  None    Airway Device:  Supraglottic airway/LMA    Airway Device Size:  4.0    Style/Cuff Inflation:  Cuffed (inflated to minimal occlusive pressure) (igel)    Secured at:  The lips    Placement Verified By:  Capnometry    Complicating Factors:  None    Findings Post-Intubation:  BS equal bilateral and atraumatic/condition of teeth unchanged

## 2024-10-01 NOTE — PLAN OF CARE
1322 Received patient from procedure room.  Report received, placed patient on monitors, VSS.  Will continue to monitor patient per protocol until discharge.

## 2024-10-04 LAB
FINAL PATHOLOGIC DIAGNOSIS: NORMAL
GROSS: NORMAL
Lab: NORMAL

## 2024-10-21 ENCOUNTER — PATIENT MESSAGE (OUTPATIENT)
Dept: OBSTETRICS AND GYNECOLOGY | Facility: CLINIC | Age: 39
End: 2024-10-21
Payer: MEDICAID

## 2024-11-04 ENCOUNTER — OFFICE VISIT (OUTPATIENT)
Dept: OBSTETRICS AND GYNECOLOGY | Facility: CLINIC | Age: 39
End: 2024-11-04
Payer: MEDICAID

## 2024-11-04 VITALS
SYSTOLIC BLOOD PRESSURE: 122 MMHG | HEIGHT: 59 IN | DIASTOLIC BLOOD PRESSURE: 78 MMHG | BODY MASS INDEX: 37.7 KG/M2 | WEIGHT: 187 LBS

## 2024-11-04 DIAGNOSIS — Z98.890 S/P ENDOMETRIAL ABLATION: Primary | ICD-10-CM

## 2024-11-04 PROCEDURE — 99213 OFFICE O/P EST LOW 20 MIN: CPT | Mod: PBBFAC | Performed by: OBSTETRICS & GYNECOLOGY

## 2024-11-04 PROCEDURE — 1159F MED LIST DOCD IN RCRD: CPT | Mod: CPTII,,, | Performed by: OBSTETRICS & GYNECOLOGY

## 2024-11-04 PROCEDURE — 99024 POSTOP FOLLOW-UP VISIT: CPT | Mod: ,,, | Performed by: OBSTETRICS & GYNECOLOGY

## 2024-11-04 PROCEDURE — 3078F DIAST BP <80 MM HG: CPT | Mod: CPTII,,, | Performed by: OBSTETRICS & GYNECOLOGY

## 2024-11-04 PROCEDURE — 99999 PR PBB SHADOW E&M-EST. PATIENT-LVL III: CPT | Mod: PBBFAC,,, | Performed by: OBSTETRICS & GYNECOLOGY

## 2024-11-04 PROCEDURE — 4010F ACE/ARB THERAPY RXD/TAKEN: CPT | Mod: CPTII,,, | Performed by: OBSTETRICS & GYNECOLOGY

## 2024-11-04 PROCEDURE — 3074F SYST BP LT 130 MM HG: CPT | Mod: CPTII,,, | Performed by: OBSTETRICS & GYNECOLOGY

## 2024-11-04 NOTE — PROGRESS NOTES
"Gynecology Visit   Post-op    Subjective:       Patient ID: Yenny Willoughby is a 39 y.o. female.    Chief Complaint:  Post-op Evaluation      History of Present Illness  Yenny Willoughby is a 39 y.o. female who presents for her postoperative appointment following endometrial ablation for menorrhagia. She reports that she is doing well. Reports light spotting for 2 weeks following the procedure. Denies pain.      GYN & OB History  No LMP recorded.   Date of Last Pap: 2023    OB History    Para Term  AB Living   6 5 5   1 5   SAB IAB Ectopic Multiple Live Births   1              # Outcome Date GA Lbr Walter/2nd Weight Sex Type Anes PTL Lv   6 SAB            5 Term      Vag-Spont      4 Term      Vag-Spont      3 Term      Vag-Spont      2 Term      Vag-Spont      1 Term      Vag-Spont              /78 (BP Location: Right arm, Patient Position: Sitting)   Ht 4' 11" (1.499 m)   Wt 84.8 kg (187 lb)   BMI 37.77 kg/m²     Objective:    Physical Exam:   Constitutional: She is oriented to person, place, and time. She appears well-developed and well-nourished.    HENT:   Head: Normocephalic and atraumatic.       Pulmonary/Chest: Effort normal.                      Neurological: She is alert and oriented to person, place, and time.     Psychiatric: She has a normal mood and affect.          Benign pathology from [facility].     Assessment:        1. S/P endometrial ablation             Plan:      Patient is doing well postoperatively.  Reviewed benign pathology.  Counseled patient that it takes up to 6 weeks to see optimal results.  May discontinue pelvic rest.  Follow up in 1 year for annual exam or sooner as needed.    Alisia Collins MD, FACOG   Gynecology    [address] [place] [id number] [phone]      "

## 2024-12-18 ENCOUNTER — HOSPITAL ENCOUNTER (INPATIENT)
Facility: HOSPITAL | Age: 39
LOS: 2 days | Discharge: HOME | End: 2024-12-20
Attending: EMERGENCY MEDICINE | Admitting: STUDENT IN AN ORGANIZED HEALTH CARE EDUCATION/TRAINING PROGRAM
Payer: MEDICAID

## 2024-12-18 DIAGNOSIS — B33.8 RSV (RESPIRATORY SYNCYTIAL VIRUS INFECTION): ICD-10-CM

## 2024-12-18 DIAGNOSIS — R07.9 CHEST PAIN: ICD-10-CM

## 2024-12-18 DIAGNOSIS — H66.91 ACUTE RIGHT OTITIS MEDIA: ICD-10-CM

## 2024-12-18 DIAGNOSIS — J45.51 SEVERE PERSISTENT ASTHMA WITH ACUTE EXACERBATION: ICD-10-CM

## 2024-12-18 DIAGNOSIS — J96.01 ACUTE HYPOXIC RESPIRATORY FAILURE: Primary | ICD-10-CM

## 2024-12-18 DIAGNOSIS — R00.0 TACHYCARDIA: ICD-10-CM

## 2024-12-18 PROBLEM — J45.901 ACUTE ASTHMA EXACERBATION: Status: ACTIVE | Noted: 2024-12-18

## 2024-12-18 PROBLEM — Z72.0 TOBACCO ABUSE: Status: ACTIVE | Noted: 2024-12-18

## 2024-12-18 PROBLEM — H66.90 OTITIS MEDIA: Status: ACTIVE | Noted: 2024-12-18

## 2024-12-18 LAB
ADENOVIRUS: NOT DETECTED
ALBUMIN SERPL BCP-MCNC: 3.5 G/DL (ref 3.5–5.2)
ALLENS TEST: ABNORMAL
ALLENS TEST: ABNORMAL
ALP SERPL-CCNC: 83 U/L (ref 40–150)
ALT SERPL W/O P-5'-P-CCNC: 10 U/L (ref 10–44)
ANION GAP SERPL CALC-SCNC: 10 MMOL/L (ref 8–16)
AST SERPL-CCNC: 14 U/L (ref 10–40)
BASOPHILS # BLD AUTO: 0.01 K/UL (ref 0–0.2)
BASOPHILS NFR BLD: 0.1 % (ref 0–1.9)
BILIRUB SERPL-MCNC: 0.4 MG/DL (ref 0.1–1)
BNP SERPL-MCNC: 11 PG/ML (ref 0–99)
BORDETELLA PARAPERTUSSIS (IS1001): NOT DETECTED
BORDETELLA PERTUSSIS (PTXP): NOT DETECTED
BUN SERPL-MCNC: 7 MG/DL (ref 6–20)
CALCIUM SERPL-MCNC: 9 MG/DL (ref 8.7–10.5)
CHLAMYDIA PNEUMONIAE: NOT DETECTED
CHLORIDE SERPL-SCNC: 106 MMOL/L (ref 95–110)
CO2 SERPL-SCNC: 21 MMOL/L (ref 23–29)
CORONAVIRUS 229E, COMMON COLD VIRUS: NOT DETECTED
CORONAVIRUS HKU1, COMMON COLD VIRUS: NOT DETECTED
CORONAVIRUS NL63, COMMON COLD VIRUS: NOT DETECTED
CORONAVIRUS OC43, COMMON COLD VIRUS: NOT DETECTED
CREAT SERPL-MCNC: 0.8 MG/DL (ref 0.5–1.4)
D DIMER PPP IA.FEU-MCNC: 0.3 MG/L FEU
DELSYS: ABNORMAL
DELSYS: ABNORMAL
DIFFERENTIAL METHOD BLD: NORMAL
EOSINOPHIL # BLD AUTO: 0.1 K/UL (ref 0–0.5)
EOSINOPHIL NFR BLD: 0.7 % (ref 0–8)
ERYTHROCYTE [DISTWIDTH] IN BLOOD BY AUTOMATED COUNT: 12.6 % (ref 11.5–14.5)
EST. GFR  (NO RACE VARIABLE): >60 ML/MIN/1.73 M^2
FLUBV RNA NPH QL NAA+NON-PROBE: NOT DETECTED
GLUCOSE SERPL-MCNC: 101 MG/DL (ref 70–110)
HCO3 UR-SCNC: 17.3 MMOL/L (ref 24–28)
HCO3 UR-SCNC: 24.4 MMOL/L (ref 24–28)
HCT VFR BLD AUTO: 40.2 % (ref 37–48.5)
HGB BLD-MCNC: 13.2 G/DL (ref 12–16)
HPIV1 RNA NPH QL NAA+NON-PROBE: NOT DETECTED
HPIV2 RNA NPH QL NAA+NON-PROBE: NOT DETECTED
HPIV3 RNA NPH QL NAA+NON-PROBE: NOT DETECTED
HPIV4 RNA NPH QL NAA+NON-PROBE: NOT DETECTED
HUMAN METAPNEUMOVIRUS: NOT DETECTED
IMM GRANULOCYTES # BLD AUTO: 0.02 K/UL (ref 0–0.04)
IMM GRANULOCYTES NFR BLD AUTO: 0.2 % (ref 0–0.5)
INFLUENZA A (SUBTYPES H1,H1-2009,H3): NOT DETECTED
INFLUENZA A, MOLECULAR: NEGATIVE
INFLUENZA B, MOLECULAR: NEGATIVE
LACTATE SERPL-SCNC: 0.6 MMOL/L (ref 0.5–2.2)
LYMPHOCYTES # BLD AUTO: 1.8 K/UL (ref 1–4.8)
LYMPHOCYTES NFR BLD: 20.2 % (ref 18–48)
MAGNESIUM SERPL-MCNC: 1.9 MG/DL (ref 1.6–2.6)
MCH RBC QN AUTO: 27.6 PG (ref 27–31)
MCHC RBC AUTO-ENTMCNC: 32.8 G/DL (ref 32–36)
MCV RBC AUTO: 84 FL (ref 82–98)
MONOCYTES # BLD AUTO: 0.6 K/UL (ref 0.3–1)
MONOCYTES NFR BLD: 7.1 % (ref 4–15)
MYCOPLASMA PNEUMONIAE: NOT DETECTED
NEUTROPHILS # BLD AUTO: 6.5 K/UL (ref 1.8–7.7)
NEUTROPHILS NFR BLD: 71.7 % (ref 38–73)
NRBC BLD-RTO: 0 /100 WBC
PCO2 BLDA: 15.3 MMHG (ref 35–45)
PCO2 BLDA: 42.8 MMHG (ref 35–45)
PH SMN: 7.36 [PH] (ref 7.35–7.45)
PH SMN: 7.66 [PH] (ref 7.35–7.45)
PLATELET # BLD AUTO: 231 K/UL (ref 150–450)
PMV BLD AUTO: 10.6 FL (ref 9.2–12.9)
PO2 BLDA: 162 MMHG (ref 80–100)
PO2 BLDA: 32 MMHG (ref 40–60)
POC BE: -1 MMOL/L
POC BE: -3 MMOL/L
POC SATURATED O2: 100 % (ref 95–100)
POC SATURATED O2: 58 % (ref 95–100)
POC TCO2: 18 MMOL/L (ref 23–27)
POC TCO2: 26 MMOL/L (ref 24–29)
POTASSIUM SERPL-SCNC: 3.5 MMOL/L (ref 3.5–5.1)
PROCALCITONIN SERPL IA-MCNC: 0.05 NG/ML
PROT SERPL-MCNC: 7.2 G/DL (ref 6–8.4)
PROVIDER CREDENTIALS: ABNORMAL
RBC # BLD AUTO: 4.79 M/UL (ref 4–5.4)
RESPIRATORY INFECTION PANEL SOURCE: ABNORMAL
RSV RNA NPH QL NAA+NON-PROBE: DETECTED
RV+EV RNA NPH QL NAA+NON-PROBE: NOT DETECTED
SAMPLE: ABNORMAL
SAMPLE: ABNORMAL
SARS-COV-2 RDRP RESP QL NAA+PROBE: NEGATIVE
SARS-COV-2 RNA RESP QL NAA+PROBE: NOT DETECTED
SITE: ABNORMAL
SITE: ABNORMAL
SODIUM SERPL-SCNC: 137 MMOL/L (ref 136–145)
SPECIMEN SOURCE: NORMAL
WBC # BLD AUTO: 9.01 K/UL (ref 3.9–12.7)

## 2024-12-18 PROCEDURE — 96372 THER/PROPH/DIAG INJ SC/IM: CPT | Performed by: EMERGENCY MEDICINE

## 2024-12-18 PROCEDURE — 63600175 PHARM REV CODE 636 W HCPCS: Mod: UD | Performed by: STUDENT IN AN ORGANIZED HEALTH CARE EDUCATION/TRAINING PROGRAM

## 2024-12-18 PROCEDURE — 25000003 PHARM REV CODE 250: Performed by: EMERGENCY MEDICINE

## 2024-12-18 PROCEDURE — 27000221 HC OXYGEN, UP TO 24 HOURS

## 2024-12-18 PROCEDURE — 85025 COMPLETE CBC W/AUTO DIFF WBC: CPT | Performed by: EMERGENCY MEDICINE

## 2024-12-18 PROCEDURE — 71250 CT THORAX DX C-: CPT | Mod: TC

## 2024-12-18 PROCEDURE — 25000242 PHARM REV CODE 250 ALT 637 W/ HCPCS: Performed by: EMERGENCY MEDICINE

## 2024-12-18 PROCEDURE — 94761 N-INVAS EAR/PLS OXIMETRY MLT: CPT

## 2024-12-18 PROCEDURE — 96365 THER/PROPH/DIAG IV INF INIT: CPT

## 2024-12-18 PROCEDURE — 80053 COMPREHEN METABOLIC PANEL: CPT | Performed by: EMERGENCY MEDICINE

## 2024-12-18 PROCEDURE — 36600 WITHDRAWAL OF ARTERIAL BLOOD: CPT

## 2024-12-18 PROCEDURE — S4991 NICOTINE PATCH NONLEGEND: HCPCS | Performed by: NURSE PRACTITIONER

## 2024-12-18 PROCEDURE — 83880 ASSAY OF NATRIURETIC PEPTIDE: CPT | Performed by: EMERGENCY MEDICINE

## 2024-12-18 PROCEDURE — 71045 X-RAY EXAM CHEST 1 VIEW: CPT | Mod: TC

## 2024-12-18 PROCEDURE — 99285 EMERGENCY DEPT VISIT HI MDM: CPT | Mod: 25

## 2024-12-18 PROCEDURE — 99900035 HC TECH TIME PER 15 MIN (STAT)

## 2024-12-18 PROCEDURE — 94799 UNLISTED PULMONARY SVC/PX: CPT

## 2024-12-18 PROCEDURE — 93005 ELECTROCARDIOGRAM TRACING: CPT

## 2024-12-18 PROCEDURE — 87635 SARS-COV-2 COVID-19 AMP PRB: CPT | Performed by: EMERGENCY MEDICINE

## 2024-12-18 PROCEDURE — 63600175 PHARM REV CODE 636 W HCPCS: Mod: UD | Performed by: EMERGENCY MEDICINE

## 2024-12-18 PROCEDURE — 82803 BLOOD GASES ANY COMBINATION: CPT

## 2024-12-18 PROCEDURE — 96366 THER/PROPH/DIAG IV INF ADDON: CPT

## 2024-12-18 PROCEDURE — 25000242 PHARM REV CODE 250 ALT 637 W/ HCPCS: Performed by: NURSE PRACTITIONER

## 2024-12-18 PROCEDURE — 85379 FIBRIN DEGRADATION QUANT: CPT | Performed by: EMERGENCY MEDICINE

## 2024-12-18 PROCEDURE — 94640 AIRWAY INHALATION TREATMENT: CPT | Mod: XB

## 2024-12-18 PROCEDURE — 25000003 PHARM REV CODE 250: Performed by: STUDENT IN AN ORGANIZED HEALTH CARE EDUCATION/TRAINING PROGRAM

## 2024-12-18 PROCEDURE — 21400001 HC TELEMETRY ROOM

## 2024-12-18 PROCEDURE — 63600175 PHARM REV CODE 636 W HCPCS: Mod: UD | Performed by: NURSE PRACTITIONER

## 2024-12-18 PROCEDURE — 25000003 PHARM REV CODE 250: Performed by: NURSE PRACTITIONER

## 2024-12-18 PROCEDURE — 87798 DETECT AGENT NOS DNA AMP: CPT | Performed by: NURSE PRACTITIONER

## 2024-12-18 PROCEDURE — 83735 ASSAY OF MAGNESIUM: CPT | Performed by: EMERGENCY MEDICINE

## 2024-12-18 PROCEDURE — 84145 PROCALCITONIN (PCT): CPT | Performed by: EMERGENCY MEDICINE

## 2024-12-18 PROCEDURE — 71045 X-RAY EXAM CHEST 1 VIEW: CPT | Mod: 26,,, | Performed by: RADIOLOGY

## 2024-12-18 PROCEDURE — 83605 ASSAY OF LACTIC ACID: CPT | Performed by: EMERGENCY MEDICINE

## 2024-12-18 PROCEDURE — 87502 INFLUENZA DNA AMP PROBE: CPT | Performed by: EMERGENCY MEDICINE

## 2024-12-18 RX ORDER — ALBUTEROL SULFATE 0.83 MG/ML
2.5 SOLUTION RESPIRATORY (INHALATION)
Status: COMPLETED | OUTPATIENT
Start: 2024-12-18 | End: 2024-12-18

## 2024-12-18 RX ORDER — IPRATROPIUM BROMIDE AND ALBUTEROL SULFATE 2.5; .5 MG/3ML; MG/3ML
3 SOLUTION RESPIRATORY (INHALATION) EVERY 6 HOURS PRN
Status: DISCONTINUED | OUTPATIENT
Start: 2024-12-18 | End: 2024-12-19

## 2024-12-18 RX ORDER — IBUPROFEN 200 MG
24 TABLET ORAL
Status: DISCONTINUED | OUTPATIENT
Start: 2024-12-18 | End: 2024-12-20 | Stop reason: HOSPADM

## 2024-12-18 RX ORDER — IPRATROPIUM BROMIDE AND ALBUTEROL SULFATE 2.5; .5 MG/3ML; MG/3ML
3 SOLUTION RESPIRATORY (INHALATION)
Status: COMPLETED | OUTPATIENT
Start: 2024-12-18 | End: 2024-12-18

## 2024-12-18 RX ORDER — HYDROMORPHONE HYDROCHLORIDE 1 MG/ML
2 INJECTION, SOLUTION INTRAMUSCULAR; INTRAVENOUS; SUBCUTANEOUS
Status: COMPLETED | OUTPATIENT
Start: 2024-12-18 | End: 2024-12-18

## 2024-12-18 RX ORDER — DEXAMETHASONE SODIUM PHOSPHATE 10 MG/ML
10 INJECTION INTRAMUSCULAR; INTRAVENOUS
Status: COMPLETED | OUTPATIENT
Start: 2024-12-18 | End: 2024-12-18

## 2024-12-18 RX ORDER — AMOXICILLIN AND CLAVULANATE POTASSIUM 875; 125 MG/1; MG/1
1 TABLET, FILM COATED ORAL EVERY 12 HOURS
Status: DISCONTINUED | OUTPATIENT
Start: 2024-12-18 | End: 2024-12-18

## 2024-12-18 RX ORDER — SODIUM CHLORIDE FOR INHALATION 3 %
4 VIAL, NEBULIZER (ML) INHALATION ONCE
Status: COMPLETED | OUTPATIENT
Start: 2024-12-18 | End: 2024-12-18

## 2024-12-18 RX ORDER — LISINOPRIL 10 MG/1
10 TABLET ORAL
Status: COMPLETED | OUTPATIENT
Start: 2024-12-18 | End: 2024-12-18

## 2024-12-18 RX ORDER — SODIUM CHLORIDE FOR INHALATION 3 %
4 VIAL, NEBULIZER (ML) INHALATION 2 TIMES DAILY
Status: DISCONTINUED | OUTPATIENT
Start: 2024-12-18 | End: 2024-12-20 | Stop reason: HOSPADM

## 2024-12-18 RX ORDER — DEXAMETHASONE SODIUM PHOSPHATE 4 MG/ML
8 INJECTION, SOLUTION INTRA-ARTICULAR; INTRALESIONAL; INTRAMUSCULAR; INTRAVENOUS; SOFT TISSUE EVERY 12 HOURS
Status: DISCONTINUED | OUTPATIENT
Start: 2024-12-18 | End: 2024-12-20 | Stop reason: HOSPADM

## 2024-12-18 RX ORDER — ONDANSETRON 4 MG/1
4 TABLET, ORALLY DISINTEGRATING ORAL
Status: COMPLETED | OUTPATIENT
Start: 2024-12-18 | End: 2024-12-18

## 2024-12-18 RX ORDER — ENOXAPARIN SODIUM 100 MG/ML
40 INJECTION SUBCUTANEOUS EVERY 24 HOURS
Status: DISCONTINUED | OUTPATIENT
Start: 2024-12-18 | End: 2024-12-20 | Stop reason: HOSPADM

## 2024-12-18 RX ORDER — SODIUM CHLORIDE 0.9 % (FLUSH) 0.9 %
3 SYRINGE (ML) INJECTION EVERY 12 HOURS PRN
Status: DISCONTINUED | OUTPATIENT
Start: 2024-12-18 | End: 2024-12-20 | Stop reason: HOSPADM

## 2024-12-18 RX ORDER — MAGNESIUM SULFATE HEPTAHYDRATE 40 MG/ML
2 INJECTION, SOLUTION INTRAVENOUS
Status: COMPLETED | OUTPATIENT
Start: 2024-12-18 | End: 2024-12-18

## 2024-12-18 RX ORDER — GLUCAGON 1 MG
1 KIT INJECTION
Status: DISCONTINUED | OUTPATIENT
Start: 2024-12-18 | End: 2024-12-20 | Stop reason: HOSPADM

## 2024-12-18 RX ORDER — CEFTRIAXONE 1 G/1
2 INJECTION, POWDER, FOR SOLUTION INTRAMUSCULAR; INTRAVENOUS
Status: DISCONTINUED | OUTPATIENT
Start: 2024-12-18 | End: 2024-12-20 | Stop reason: HOSPADM

## 2024-12-18 RX ORDER — NALOXONE HCL 0.4 MG/ML
0.02 VIAL (ML) INJECTION
Status: DISCONTINUED | OUTPATIENT
Start: 2024-12-18 | End: 2024-12-20 | Stop reason: HOSPADM

## 2024-12-18 RX ORDER — ALBUTEROL SULFATE 90 UG/1
2 INHALANT RESPIRATORY (INHALATION) EVERY 8 HOURS
Status: DISCONTINUED | OUTPATIENT
Start: 2024-12-18 | End: 2024-12-18 | Stop reason: CLARIF

## 2024-12-18 RX ORDER — ACETAMINOPHEN 325 MG/1
650 TABLET ORAL EVERY 6 HOURS PRN
Status: DISCONTINUED | OUTPATIENT
Start: 2024-12-18 | End: 2024-12-20 | Stop reason: HOSPADM

## 2024-12-18 RX ORDER — POTASSIUM CHLORIDE 20 MEQ/1
20 TABLET, EXTENDED RELEASE ORAL
Status: COMPLETED | OUTPATIENT
Start: 2024-12-18 | End: 2024-12-18

## 2024-12-18 RX ORDER — IBUPROFEN 200 MG
1 TABLET ORAL DAILY
Status: DISCONTINUED | OUTPATIENT
Start: 2024-12-18 | End: 2024-12-19

## 2024-12-18 RX ORDER — ALBUTEROL SULFATE 0.83 MG/ML
2.5 SOLUTION RESPIRATORY (INHALATION) EVERY 8 HOURS
Status: DISCONTINUED | OUTPATIENT
Start: 2024-12-18 | End: 2024-12-18

## 2024-12-18 RX ORDER — DEXAMETHASONE SODIUM PHOSPHATE 10 MG/ML
10 INJECTION INTRAMUSCULAR; INTRAVENOUS ONCE
Status: DISCONTINUED | OUTPATIENT
Start: 2024-12-19 | End: 2024-12-18

## 2024-12-18 RX ORDER — LEVALBUTEROL 1.25 MG/.5ML
1.25 SOLUTION, CONCENTRATE RESPIRATORY (INHALATION) EVERY 8 HOURS
Status: DISCONTINUED | OUTPATIENT
Start: 2024-12-18 | End: 2024-12-20 | Stop reason: HOSPADM

## 2024-12-18 RX ORDER — IBUPROFEN 200 MG
16 TABLET ORAL
Status: DISCONTINUED | OUTPATIENT
Start: 2024-12-18 | End: 2024-12-20 | Stop reason: HOSPADM

## 2024-12-18 RX ORDER — ALPRAZOLAM 0.25 MG/1
0.5 TABLET ORAL 3 TIMES DAILY PRN
Status: DISCONTINUED | OUTPATIENT
Start: 2024-12-18 | End: 2024-12-20 | Stop reason: HOSPADM

## 2024-12-18 RX ORDER — TALC
6 POWDER (GRAM) TOPICAL NIGHTLY PRN
Status: DISCONTINUED | OUTPATIENT
Start: 2024-12-18 | End: 2024-12-20 | Stop reason: HOSPADM

## 2024-12-18 RX ORDER — ONDANSETRON HYDROCHLORIDE 2 MG/ML
4 INJECTION, SOLUTION INTRAVENOUS EVERY 8 HOURS PRN
Status: DISCONTINUED | OUTPATIENT
Start: 2024-12-18 | End: 2024-12-20 | Stop reason: HOSPADM

## 2024-12-18 RX ORDER — KETOROLAC TROMETHAMINE 10 MG/1
10 TABLET, FILM COATED ORAL EVERY 6 HOURS PRN
Status: DISCONTINUED | OUTPATIENT
Start: 2024-12-18 | End: 2024-12-20 | Stop reason: HOSPADM

## 2024-12-18 RX ADMIN — ENOXAPARIN SODIUM 40 MG: 40 INJECTION SUBCUTANEOUS at 04:12

## 2024-12-18 RX ADMIN — AMOXICILLIN AND CLAVULANATE POTASSIUM 1 TABLET: 875; 125 TABLET, FILM COATED ORAL at 11:12

## 2024-12-18 RX ADMIN — NICOTINE 1 PATCH: 14 PATCH, EXTENDED RELEASE TRANSDERMAL at 11:12

## 2024-12-18 RX ADMIN — ALPRAZOLAM 0.5 MG: 0.25 TABLET ORAL at 12:12

## 2024-12-18 RX ADMIN — POTASSIUM CHLORIDE 20 MEQ: 1500 TABLET, EXTENDED RELEASE ORAL at 11:12

## 2024-12-18 RX ADMIN — IPRATROPIUM BROMIDE AND ALBUTEROL SULFATE 3 ML: .5; 2.5 SOLUTION RESPIRATORY (INHALATION) at 07:12

## 2024-12-18 RX ADMIN — LISINOPRIL 10 MG: 10 TABLET ORAL at 08:12

## 2024-12-18 RX ADMIN — IPRATROPIUM BROMIDE AND ALBUTEROL SULFATE 3 ML: .5; 2.5 SOLUTION RESPIRATORY (INHALATION) at 10:12

## 2024-12-18 RX ADMIN — HYDROMORPHONE HYDROCHLORIDE 2 MG: 1 INJECTION, SOLUTION INTRAMUSCULAR; INTRAVENOUS; SUBCUTANEOUS at 04:12

## 2024-12-18 RX ADMIN — MAGNESIUM SULFATE HEPTAHYDRATE 2 G: 40 INJECTION, SOLUTION INTRAVENOUS at 05:12

## 2024-12-18 RX ADMIN — SODIUM CHLORIDE SOLN NEBU 3% 4 ML: 3 NEBU SOLN at 10:12

## 2024-12-18 RX ADMIN — DEXAMETHASONE SODIUM PHOSPHATE 8 MG: 4 INJECTION INTRA-ARTICULAR; INTRALESIONAL; INTRAMUSCULAR; INTRAVENOUS; SOFT TISSUE at 09:12

## 2024-12-18 RX ADMIN — ONDANSETRON 4 MG: 4 TABLET, ORALLY DISINTEGRATING ORAL at 04:12

## 2024-12-18 RX ADMIN — CEFTRIAXONE SODIUM 2 G: 1 INJECTION, POWDER, FOR SOLUTION INTRAMUSCULAR; INTRAVENOUS at 12:12

## 2024-12-18 RX ADMIN — ALBUTEROL SULFATE 2.5 MG: 2.5 SOLUTION RESPIRATORY (INHALATION) at 05:12

## 2024-12-18 RX ADMIN — IPRATROPIUM BROMIDE AND ALBUTEROL SULFATE 3 ML: .5; 2.5 SOLUTION RESPIRATORY (INHALATION) at 04:12

## 2024-12-18 RX ADMIN — DEXAMETHASONE SODIUM PHOSPHATE 10 MG: 10 INJECTION INTRAMUSCULAR; INTRAVENOUS at 04:12

## 2024-12-18 RX ADMIN — POTASSIUM CHLORIDE 20 MEQ: 1500 TABLET, EXTENDED RELEASE ORAL at 02:12

## 2024-12-18 NOTE — H&P
[facility] Medicine  History & Physical    Patient Name: Yenny Willoughby  MRN: [id number]  Patient Class: OP- Observation  Admission Date: 12/18/2024  Attending Physician: Juliano Rowe MD   Primary Care Provider: Nevaeh Primary Doctor         Patient information was obtained from patient and ER records.     Subjective:     Principal Problem:Acute hypoxic respiratory failure    Chief Complaint:   Chief Complaint   Patient presents with    Cough     Pt reports she was seen at urgent care Tuesday and was dx with bronchitis. Pt reports she is feeling worse and feels like she is wheezing.     Headache     Pt reports migraine earlier today and BP was elevated.     Otalgia     Pt reports right ear pain that began a few hours ago. Pt reports she took 800 mg of Ibuprofen.         HPI: Yenny Willoughby is a 39 year old female w/ PMH asthma, tobacco abuse, anemia, HTN not on medication, anxiety, and depression. She reports that she has been experiencing worsening wheezing for a week so she went to urgent care 12/17 and was diagnosed with bronchitis and sent home with a prescription for doxycycline. She presented to the ED with severe right ear pain with bloody drainage and continued wheezing. She also reports chills but denies fever. She denies sinus pressure, sore throat, productive cough, nausea, or vomiting.     Past Medical History:   Diagnosis Date    Anemia     Asthma     Heavy menstrual bleeding     Hypertension     no meds    Mental disorder     anxiety/depression       Past Surgical History:   Procedure Laterality Date    CARPAL TUNNEL RELEASE Bilateral     ENDOMETRIAL ABLATION N/A 10/1/2024    Procedure: ABLATION, ENDOMETRIUM;  Surgeon: Alisia Collins MD;  Location: [facility] OR;  Service: OB/GYN;  Laterality: N/A;    HYSTEROSCOPY WITH DILATION AND CURETTAGE OF UTERUS N/A 10/1/2024    Procedure: HYSTEROSCOPY, WITH DILATION AND CURETTAGE OF UTERUS;  Surgeon: Alisia Collins MD;  Location: [facility] OR;   Service: OB/GYN;  Laterality: N/A;    TUBAL LIGATION      PPTL       Review of patient's allergies indicates:   Allergen Reactions    Prednisone Hives       No current facility-administered medications on file prior to encounter.     Current Outpatient Medications on File Prior to Encounter   Medication Sig    albuterol (PROVENTIL/VENTOLIN HFA) 90 mcg/actuation inhaler Inhale 2 puffs into the lungs every 6 (six) hours as needed.    [DISCONTINUED] buPROPion (WELLBUTRIN XL) 150 MG TB24 tablet Take 150 mg by mouth.    [DISCONTINUED] docusate sodium (COLACE) 100 MG capsule Take 1 capsule (100 mg total) by mouth 2 (two) times daily as needed for Constipation. (Patient not taking: Reported on 11/4/2024)    [DISCONTINUED] HYDROcodone-acetaminophen (NORCO) 5-325 mg per tablet Take 1 tablet by mouth every 6 (six) hours as needed for Pain. (Patient not taking: Reported on 11/4/2024)    [DISCONTINUED] ibuprofen (ADVIL,MOTRIN) 800 MG tablet Take 1 tablet (800 mg total) by mouth every 8 (eight) hours as needed for Pain.    [DISCONTINUED] lisinopriL 10 MG tablet Take 10 mg by mouth. (Patient not taking: Reported on 11/4/2024)     Family History    None       Tobacco Use    Smoking status: Every Day     Types: Cigarettes    Smokeless tobacco: Never   Substance and Sexual Activity    Alcohol use: Not Currently    Drug use: Not on file    Sexual activity: Yes     Review of Systems   Constitutional:  Positive for chills and fatigue. Negative for fever.   HENT:  Positive for ear discharge (blood) and ear pain. Negative for congestion, sinus pressure, sinus pain and sore throat.    Eyes:  Negative for discharge, redness, itching and visual disturbance.   Respiratory:  Negative for shortness of breath.    Cardiovascular:  Negative for chest pain.   Gastrointestinal:  Negative for abdominal pain, nausea and vomiting.   Genitourinary:  Negative for dysuria.   Musculoskeletal:  Positive for myalgias.   Skin:  Negative for color change.    Neurological:  Negative for dizziness, speech difficulty and light-headedness.   Psychiatric/Behavioral:  Negative for agitation and confusion. The patient is not nervous/anxious.      Objective:     Vital Signs (Most Recent):  Temp: 98.1 °F (36.7 °C) (12/18/24 1030)  Pulse: (!) 114 (12/18/24 1030)  Resp: (!) 22 (12/18/24 1030)  BP: 126/82 (12/18/24 1030)  SpO2: 98 % (12/18/24 1030) Vital Signs (24h Range):  Temp:  [98.1 °F (36.7 °C)] 98.1 °F (36.7 °C)  Pulse:  [] 114  Resp:  [20-30] 22  SpO2:  [84 %-100 %] 98 %  BP: (126-177)/() 126/82     Weight: 79.4 kg (175 lb)  Body mass index is 35.35 kg/m².     Physical Exam  Constitutional:       General: She is not in acute distress.     Appearance: She is ill-appearing.   HENT:      Head: Normocephalic and atraumatic.      Right Ear: Tympanic membrane is erythematous. Tympanic membrane is not perforated.      Left Ear: Tympanic membrane normal.      Ears:      Comments: Right ear with dried blood noted in canal and appearance of scratch in canal     Nose: Nose normal.      Mouth/Throat:      Mouth: Mucous membranes are moist.   Eyes:      Extraocular Movements: Extraocular movements intact.      Conjunctiva/sclera:      Right eye: Right conjunctiva is injected.      Left eye: Left conjunctiva is injected.      Pupils: Pupils are equal, round, and reactive to light.   Cardiovascular:      Rate and Rhythm: Normal rate and regular rhythm.      Pulses: Normal pulses.      Heart sounds: Normal heart sounds.   Pulmonary:      Effort: Pulmonary effort is normal. No respiratory distress.      Breath sounds: No stridor. Wheezing and rales present.   Chest:      Chest wall: Tenderness present.   Abdominal:      General: Bowel sounds are normal. There is no distension.      Palpations: Abdomen is soft.      Tenderness: There is no abdominal tenderness. There is no guarding or rebound.   Musculoskeletal:      Cervical back: No rigidity.      Right lower leg: No edema.       Left lower leg: No edema.   Skin:     General: Skin is warm.      Coloration: Skin is pale.   Neurological:      Mental Status: She is alert and oriented to person, place, and time.   Psychiatric:         Mood and Affect: Mood normal.         Behavior: Behavior normal.         Thought Content: Thought content normal.         Judgment: Judgment normal.              CRANIAL NERVES     CN III, IV, VI   Pupils are equal, round, and reactive to light.       Significant Labs: All pertinent labs within the past 24 hours have been reviewed.    Significant Imaging: I have reviewed all pertinent imaging results/findings within the past 24 hours.  Assessment/Plan:     * Acute hypoxic respiratory failure  Patient with Hypoxic Respiratory failure which is Acute.  she is not on home oxygen. Supplemental oxygen was provided and noted-      .   Signs/symptoms of respiratory failure include- tachypnea, increased work of breathing, and wheezing. Contributing diagnoses includes -  asthma  Labs and images were reviewed. Patient Has recent ABG, which has been reviewed. Will treat underlying causes and adjust management of respiratory failure as follows- breathing tx, steroids, supplemental O2    Attempt made to ambulate patient on RA but she desatted to 84%, RR 30, and   Still with wheezing s/p 1x albuterol nebs tx and 3 duo-nebs tx  CXR does not show obvious signs of PNA, but will order CT chest to further evaluate  Flu and COVID negative    Otitis media  Right ear pain with dried blood noted in the canal  It appears that there is a scratch in the canal where the blood is originating  TM erythematous but no perforation noted  Augmentin BID x5 days  PRN toradol for pain      Tobacco abuse  Nicotine patch  Encourage cessation      Hypertension  Patient's blood pressure range in the last 24 hours was: BP  Min: 126/82  Max: 177/107.The patient's inpatient anti-hypertensive regimen is listed below:  Current Antihypertensives        Plan  - BP is uncontrolled, will adjust as follows: 1x dose lisinopril given for /107  - Patient does not take BP meds outpatient, but if she requires more treatment while in the hospital she may need to be discharged on meds      VTE Risk Mitigation (From admission, onward)           Ordered     enoxaparin injection 40 mg  Daily         12/18/24 0948     IP VTE HIGH RISK PATIENT  Once         12/18/24 0948     Place sequential compression device  Until discontinued         12/18/24 0948                         On 12/18/2024, patient should be placed in hospital observation services under my care in collaboration with Juliano Rowe MD.           Meaghan Estrada NP  Department of Hospital Medicine  [facility]

## 2024-12-18 NOTE — CARE UPDATE
12/18/24 0749   Patient Assessment/Suction   Level of Consciousness (AVPU) alert   Respiratory Effort Mild;Labored   Expansion/Accessory Muscles/Retractions no use of accessory muscles;no retractions;expansion symmetric   All Lung Fields Breath Sounds Anterior:;Posterior:;Lateral:;wheezes, expiratory   ERNST Breath Sounds wheezes, expiratory   LLL Breath Sounds wheezes, expiratory   RUL Breath Sounds wheezes, expiratory   RML Breath Sounds wheezes, expiratory   RLL Breath Sounds wheezes, expiratory   Rhythm/Pattern, Respiratory depth regular;pattern regular   Cough Frequency frequent   Cough Type nonproductive   PRE-TX-O2   Device (Oxygen Therapy) nasal cannula   $ Is the patient on Low Flow Oxygen? Yes   Flow (L/min) (Oxygen Therapy) 2   SpO2 97 %   Pulse Oximetry Type Continuous   Pulse 81   Resp (!) 22   Positioning   Head of Bed (HOB) Positioning HOB at 60-90 degrees   Aerosol Therapy   $ Aerosol Therapy Charges Aerosol Treatment   Daily Review of Necessity (SVN) completed   Respiratory Treatment Status (SVN) given   Treatment Route (SVN) oxygen;mouthpiece utilized   Patient Position HOB elevated   Post Treatment Assessment (SVN) breath sounds improved;wheezing decreased;increased aeration   Signs of Intolerance (SVN) none   Breath Sounds Post-Respiratory Treatment   Throughout All Fields Post-Treatment All Fields   Throughout All Fields Post-Treatment Anterior:;Posterior:;Lateral:;aeration increased   Post-treatment Heart Rate (beats/min) 86   Post-treatment Resp Rate (breaths/min) 20   Equipment Change   $ RT Equipment other (see comments)  (aeroeclipse nebulzier)

## 2024-12-18 NOTE — ASSESSMENT & PLAN NOTE
Right ear pain with dried blood noted in the canal  It appears that there is a scratch in the canal where the blood is originating  TM erythematous but no perforation noted  Augmentin BID x5 days  PRN toradol for pain

## 2024-12-18 NOTE — PLAN OF CARE
[facility] - Med Surg  Initial Discharge Assessment       Primary Care Provider: No, Primary Doctor    Admission Diagnosis: Acute right otitis media [H66.91]  Severe persistent asthma with acute exacerbation [J45.51]  Chest pain [R07.9]  Acute hypoxic respiratory failure [J96.01]    Admission Date: 12/18/2024  Expected Discharge Date:     Patient alert & oriented lives with her cousin Esha Chase. She has a friend Kendal Bowser that will assist her if needed. She is independent at home. She denies having any medical equipment at this time. She did borrow her cousin's nebulizer recently when she was wheezing. She will need one at discharge. She plans on using [facility] in [place] for PCP.Her pharmacy of choice is [facility] in [place].  She drove herself to ER & plans on driving herself home. Denies any other needs at this time. Will continue to follow along with .     Transition of Care Barriers: None    Payor: [place] MEDICAID / Plan: [place] MEDICAID [place] HEALTH PLAN / Product Type: Managed Medicaid /     Extended Emergency Contact Information  Primary Emergency Contact: Anjali Romero  Mobile Phone: [phone]  Relation: Friend  Preferred language: English   needed? No    Discharge Plan A: Home with family  Discharge Plan B: Home      [id number] - [place], [place] - [address] [facility]  [address]  [address]  [facility] [address]  Phone: [phone] Fax: [phone]      Initial Assessment (most recent)       Adult Discharge Assessment - 12/18/24 1022          Discharge Assessment    Assessment Type Discharge Planning Assessment     Confirmed/corrected address, phone number and insurance Yes     Source of Information patient     When was your last doctors appointment? 12/10/24     Communicated IRA with patient/caregiver Yes     Reason For Admission ear pain & wheezing     People in Home other relative(s)   lives with her cousin  Esha Chase    Do you expect to return to your current living situation? Yes     Do you have help at home or someone to help you manage your care at home? Yes     Who are your caregiver(s) and their phone number(s)? Esha Chase cousin     Prior to hospitilization cognitive status: Alert/Oriented     Current cognitive status: Alert/Oriented     Walking or Climbing Stairs Difficulty no     Dressing/Bathing Difficulty no     Home Layout Able to live on 1st floor     Equipment Currently Used at Home none   recently borrowed her cousin's nebulizer    Readmission within 30 days? No     Patient currently being followed by outpatient case management? No     Do you currently have service(s) that help you manage your care at home? No     Do you take prescription medications? Yes     Do you have prescription coverage? Yes     Coverage Medicaid     Do you have any problems affording any of your prescribed medications? No     Is the patient taking medications as prescribed? no     If no, which medications is patient not taking? all of them as ran out     Who is going to help you get home at discharge? drove self to ER     How do you get to doctors appointments? car, drives self     Are you on dialysis? No     Do you take coumadin? No     Discharge Plan A Home with family     Discharge Plan B Home     DME Needed Upon Discharge  nebulizer     Discharge Plan discussed with: Patient     Transition of Care Barriers None        Physical Activity    On average, how many days per week do you engage in moderate to strenuous exercise (like a brisk walk)? 7 days     On average, how many minutes do you engage in exercise at this level? 150+ min        Financial Resource Strain    How hard is it for you to pay for the very basics like food, housing, medical care, and heating? Somewhat hard        Housing Stability    In the last 12 months, was there a time when you were not able to pay the mortgage or rent on time? No     At any time in  the past 12 months, were you homeless or living in a shelter (including now)? Yes        Transportation Needs    Has the lack of transportation kept you from medical appointments, meetings, work or from getting things needed for daily living? No        Food Insecurity    Within the past 12 months, you worried that your food would run out before you got the money to buy more. Never true     Within the past 12 months, the food you bought just didn't last and you didn't have money to get more. Never true        Stress    Do you feel stress - tense, restless, nervous, or anxious, or unable to sleep at night because your mind is troubled all the time - these days? Very much   has PTSD, depression & anxiety       Social Isolation    How often do you feel lonely or isolated from those around you?  Always        Alcohol Use    Q1: How often do you have a drink containing alcohol? Never     Q2: How many drinks containing alcohol do you have on a typical day when you are drinking? Patient does not drink     Q3: How often do you have six or more drinks on one occasion? Never        Utilities    In the past 12 months has the electric, gas, oil, or water company threatened to shut off services in your home? No        Health Literacy    How often do you need to have someone help you when you read instructions, pamphlets, or other written material from your doctor or pharmacy? Never        OTHER    Name(s) of People in Home her cousin Esha Chase

## 2024-12-18 NOTE — SUBJECTIVE & OBJECTIVE
Past Medical History:   Diagnosis Date    Anemia     Asthma     Heavy menstrual bleeding     Hypertension     no meds    Mental disorder     anxiety/depression       Past Surgical History:   Procedure Laterality Date    CARPAL TUNNEL RELEASE Bilateral     ENDOMETRIAL ABLATION N/A 10/1/2024    Procedure: ABLATION, ENDOMETRIUM;  Surgeon: Alisia Collins MD;  Location: [facility] OR;  Service: OB/GYN;  Laterality: N/A;    HYSTEROSCOPY WITH DILATION AND CURETTAGE OF UTERUS N/A 10/1/2024    Procedure: HYSTEROSCOPY, WITH DILATION AND CURETTAGE OF UTERUS;  Surgeon: Alisia Collins MD;  Location: [facility] OR;  Service: OB/GYN;  Laterality: N/A;    TUBAL LIGATION      PPTL       Review of patient's allergies indicates:   Allergen Reactions    Prednisone Hives       No current facility-administered medications on file prior to encounter.     Current Outpatient Medications on File Prior to Encounter   Medication Sig    albuterol (PROVENTIL/VENTOLIN HFA) 90 mcg/actuation inhaler Inhale 2 puffs into the lungs every 6 (six) hours as needed.    [DISCONTINUED] buPROPion (WELLBUTRIN XL) 150 MG TB24 tablet Take 150 mg by mouth.    [DISCONTINUED] docusate sodium (COLACE) 100 MG capsule Take 1 capsule (100 mg total) by mouth 2 (two) times daily as needed for Constipation. (Patient not taking: Reported on 11/4/2024)    [DISCONTINUED] HYDROcodone-acetaminophen (NORCO) 5-325 mg per tablet Take 1 tablet by mouth every 6 (six) hours as needed for Pain. (Patient not taking: Reported on 11/4/2024)    [DISCONTINUED] ibuprofen (ADVIL,MOTRIN) 800 MG tablet Take 1 tablet (800 mg total) by mouth every 8 (eight) hours as needed for Pain.    [DISCONTINUED] lisinopriL 10 MG tablet Take 10 mg by mouth. (Patient not taking: Reported on 11/4/2024)     Family History    None       Tobacco Use    Smoking status: Every Day     Types: Cigarettes    Smokeless tobacco: Never   Substance and Sexual Activity    Alcohol use: Not Currently    Drug use: Not on file     Sexual activity: Yes     Review of Systems   Constitutional:  Positive for chills and fatigue. Negative for fever.   HENT:  Positive for ear discharge (blood) and ear pain. Negative for congestion, sinus pressure, sinus pain and sore throat.    Eyes:  Negative for discharge, redness, itching and visual disturbance.   Respiratory:  Negative for shortness of breath.    Cardiovascular:  Negative for chest pain.   Gastrointestinal:  Negative for abdominal pain, nausea and vomiting.   Genitourinary:  Negative for dysuria.   Musculoskeletal:  Positive for myalgias.   Skin:  Negative for color change.   Neurological:  Negative for dizziness, speech difficulty and light-headedness.   Psychiatric/Behavioral:  Negative for agitation and confusion. The patient is not nervous/anxious.      Objective:     Vital Signs (Most Recent):  Temp: 98.1 °F (36.7 °C) (12/18/24 1030)  Pulse: (!) 114 (12/18/24 1030)  Resp: (!) 22 (12/18/24 1030)  BP: 126/82 (12/18/24 1030)  SpO2: 98 % (12/18/24 1030) Vital Signs (24h Range):  Temp:  [98.1 °F (36.7 °C)] 98.1 °F (36.7 °C)  Pulse:  [] 114  Resp:  [20-30] 22  SpO2:  [84 %-100 %] 98 %  BP: (126-177)/() 126/82     Weight: 79.4 kg (175 lb)  Body mass index is 35.35 kg/m².     Physical Exam  Constitutional:       General: She is not in acute distress.     Appearance: She is ill-appearing.   HENT:      Head: Normocephalic and atraumatic.      Right Ear: Tympanic membrane is erythematous. Tympanic membrane is not perforated.      Left Ear: Tympanic membrane normal.      Ears:      Comments: Right ear with dried blood noted in canal and appearance of scratch in canal     Nose: Nose normal.      Mouth/Throat:      Mouth: Mucous membranes are moist.   Eyes:      Extraocular Movements: Extraocular movements intact.      Conjunctiva/sclera:      Right eye: Right conjunctiva is injected.      Left eye: Left conjunctiva is injected.      Pupils: Pupils are equal, round, and reactive to light.    Cardiovascular:      Rate and Rhythm: Normal rate and regular rhythm.      Pulses: Normal pulses.      Heart sounds: Normal heart sounds.   Pulmonary:      Effort: Pulmonary effort is normal. No respiratory distress.      Breath sounds: No stridor. Wheezing and rales present.   Chest:      Chest wall: Tenderness present.   Abdominal:      General: Bowel sounds are normal. There is no distension.      Palpations: Abdomen is soft.      Tenderness: There is no abdominal tenderness. There is no guarding or rebound.   Musculoskeletal:      Cervical back: No rigidity.      Right lower leg: No edema.      Left lower leg: No edema.   Skin:     General: Skin is warm.      Coloration: Skin is pale.   Neurological:      Mental Status: She is alert and oriented to person, place, and time.   Psychiatric:         Mood and Affect: Mood normal.         Behavior: Behavior normal.         Thought Content: Thought content normal.         Judgment: Judgment normal.              CRANIAL NERVES     CN III, IV, VI   Pupils are equal, round, and reactive to light.       Significant Labs: All pertinent labs within the past 24 hours have been reviewed.    Significant Imaging: I have reviewed all pertinent imaging results/findings within the past 24 hours.

## 2024-12-18 NOTE — LETTER
December 20, 2024         [address] [address]  Phone: [phone]  Fax: [phone]       Patient: Yenny Willoughby   YOB: 1985  Date of Visit: 12/20/2024    To Whom It May Concern:    SUMANTH Willoughby  was at [facility] on 12/18/2024-12/20/2024. The patient may return to work/school on 12/26/24 with no restrictions. If you have any questions or concerns, or if I can be of further assistance, please do not hesitate to contact me.    Sincerely,      Mikael Pelaez RN

## 2024-12-18 NOTE — PLAN OF CARE
Problem: Adult Inpatient Plan of Care  Goal: Plan of Care Review  Outcome: Progressing  Goal: Patient-Specific Goal (Individualized)  Outcome: Progressing  Goal: Absence of Hospital-Acquired Illness or Injury  Outcome: Progressing  Goal: Optimal Comfort and Wellbeing  Outcome: Progressing  Goal: Readiness for Transition of Care  Outcome: Progressing     Problem: Infection  Goal: Absence of Infection Signs and Symptoms  Outcome: Progressing     Problem: Gas Exchange Impaired  Goal: Optimal Gas Exchange  Outcome: Progressing     Problem: Electrolyte Imbalance  Goal: Electrolyte Balance  Outcome: Progressing     Problem: Dysrhythmia  Goal: Normalized Cardiac Rhythm  Outcome: Progressing

## 2024-12-18 NOTE — NURSING
Notified Dr. Rowe that patient's heart rate is sustaining sinus tachycardia 140's. MD voiced understanding and stated to get a stat EKG, and on the way to see patient now.

## 2024-12-18 NOTE — ASSESSMENT & PLAN NOTE
Duo-neb treatments  Supplemental O2  Steroids  Chest CT   1. Dependent discoid atelectasis at the lung bases without focal consolidation.  2. Splenomegaly.  Abx changed to rocephin to cover for possible early PNA

## 2024-12-18 NOTE — CARE UPDATE
12/18/24 1055   Patient Assessment/Suction   Level of Consciousness (AVPU) alert   Respiratory Effort Mild;Labored   Expansion/Accessory Muscles/Retractions no use of accessory muscles;no retractions;expansion symmetric   All Lung Fields Breath Sounds Anterior:;Posterior:;Lateral:;wheezes, expiratory;coarse   Rhythm/Pattern, Respiratory depth regular;pattern regular   Cough Frequency frequent   Cough Type good;productive   PRE-TX-O2   Device (Oxygen Therapy) nasal cannula   Flow (L/min) (Oxygen Therapy) 2   SpO2 97 %   Pulse Oximetry Type Intermittent   Pulse 106   Resp 20   Aerosol Therapy   $ Aerosol Therapy Charges Aerosol Treatment   Respiratory Treatment Status (SVN) given   Treatment Route (SVN) oxygen;mouthpiece utilized   Patient Position HOB elevated   Post Treatment Assessment (SVN) increased aeration;wheezing decreased;congestion decreased   Signs of Intolerance (SVN) none   Breath Sounds Post-Respiratory Treatment   Throughout All Fields Post-Treatment All Fields   Throughout All Fields Post-Treatment Anterior:;Lateral:;Posterior:;aeration increased;coarse;wheezes, expiratory   Post-treatment Heart Rate (beats/min) 102   Post-treatment Resp Rate (breaths/min) 20

## 2024-12-18 NOTE — ASSESSMENT & PLAN NOTE
Patient with Hypoxic Respiratory failure which is Acute.  she is not on home oxygen. Supplemental oxygen was provided and noted-      .   Signs/symptoms of respiratory failure include- tachypnea, increased work of breathing, and wheezing. Contributing diagnoses includes -  asthma, bronchitis, RSV  Labs and images were reviewed. Patient Has recent ABG, which has been reviewed. Will treat underlying causes and adjust management of respiratory failure as follows- breathing tx, steroids, supplemental O2    Attempt made to ambulate patient on RA but she desatted to 84%, RR 30, and   Still with wheezing despite tx, however no longer tachypneic or tachycardic  Atelectasis noted on CT but no consolidation  Rocephin started to cover for possible early PN  Continue breathing tx, add ipratropium  Continue xanax for anxiety  Flu and COVID negative  RSV positive

## 2024-12-18 NOTE — HPI
Yenny Willoughby is a 39 year old female w/ PMH asthma, tobacco abuse, anemia, HTN not on medication, anxiety, and depression. She reports that she has been experiencing worsening wheezing for a week so she went to urgent care 12/17 and was diagnosed with bronchitis and sent home with a prescription for doxycycline. She presented to the ED with severe right ear pain with bloody drainage and continued wheezing. She also reports chills but denies fever. She denies sinus pressure, sore throat, productive cough, nausea, or vomiting.

## 2024-12-18 NOTE — ASSESSMENT & PLAN NOTE
Patient's blood pressure range in the last 24 hours was: BP  Min: 126/82  Max: 177/107.The patient's inpatient anti-hypertensive regimen is listed below:  Current Antihypertensives       Plan  - BP is uncontrolled, will adjust as follows: 1x dose lisinopril given for /107  - Patient does not take BP meds outpatient, but if she requires more treatment while in the hospital she may need to be discharged on meds

## 2024-12-18 NOTE — ASSESSMENT & PLAN NOTE
Patient with Hypoxic Respiratory failure which is Acute.  she is not on home oxygen. Supplemental oxygen was provided and noted-      .   Signs/symptoms of respiratory failure include- tachypnea, increased work of breathing, and wheezing. Contributing diagnoses includes -  asthma  Labs and images were reviewed. Patient Has recent ABG, which has been reviewed. Will treat underlying causes and adjust management of respiratory failure as follows- breathing tx, steroids, supplemental O2    Attempt made to ambulate patient on RA but she desatted to 84%, RR 30, and   Still with wheezing s/p 1x albuterol nebs tx and 3 duo-nebs tx  CXR does not show obvious signs of PNA, but will order CT chest to further evaluate  Flu and COVID negative
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
## 2024-12-18 NOTE — ED PROVIDER NOTES
Encounter Date: 12/18/2024       History     Chief Complaint   Patient presents with    Cough     Pt reports she was seen at urgent care Tuesday and was dx with bronchitis. Pt reports she is feeling worse and feels like she is wheezing.     Headache     Pt reports migraine earlier today and BP was elevated.     Otalgia     Pt reports right ear pain that began a few hours ago. Pt reports she took 800 mg of Ibuprofen.      Patient is a 39-year-old female with history of hypertension, anxiety/depression, anemia and asthma here tonight with complaints of acute onset of constant, severe right ear pain.  Patient also reports she has had a cough and wheeze for a week and is currently on doxycycline.  No trauma.    The history is provided by the patient and medical records.     Review of patient's allergies indicates:   Allergen Reactions    Prednisone Hives     Past Medical History:   Diagnosis Date    Anemia     Asthma     Heavy menstrual bleeding     Hypertension     no meds    Mental disorder     anxiety/depression     Past Surgical History:   Procedure Laterality Date    CARPAL TUNNEL RELEASE Bilateral     ENDOMETRIAL ABLATION N/A 10/1/2024    Procedure: ABLATION, ENDOMETRIUM;  Surgeon: Alisia Collins MD;  Location: [facility] OR;  Service: OB/GYN;  Laterality: N/A;    HYSTEROSCOPY WITH DILATION AND CURETTAGE OF UTERUS N/A 10/1/2024    Procedure: HYSTEROSCOPY, WITH DILATION AND CURETTAGE OF UTERUS;  Surgeon: Alisia Collins MD;  Location: [facility] OR;  Service: OB/GYN;  Laterality: N/A;    TUBAL LIGATION      PPTL     Family History   Problem Relation Name Age of Onset    Breast cancer Neg Hx      Colon cancer Neg Hx      Ovarian cancer Neg Hx      Uterine cancer Neg Hx       Social History     Tobacco Use    Smoking status: Every Day     Types: Cigarettes    Smokeless tobacco: Never   Substance Use Topics    Alcohol use: Not Currently     Review of Systems   HENT:  Positive for ear pain.    Respiratory:  Positive for cough,  shortness of breath and wheezing.    All other systems reviewed and are negative.      Physical Exam     Initial Vitals [12/18/24 0421]   BP Pulse Resp Temp SpO2   (!) 170/119 (!) 112 20 98.1 °F (36.7 °C) 96 %      MAP       --         Physical Exam    Constitutional: She appears well-developed and well-nourished.   HENT:   Head: Normocephalic and atraumatic.   Right TM bulging and hemorrhagic.   Eyes: EOM are normal.   Neck: Neck supple.   Cardiovascular:            Tachycardic.  Normal cap refill.   Pulmonary/Chest:   Tachypneic, audible wheezing, mild accessory muscle use.   Abdominal: Abdomen is soft.   Musculoskeletal:         General: No edema.      Cervical back: Neck supple.     Neurological: She is alert and oriented to person, place, and time.   Grossly afocal, normal gait in hallway.   Skin: Skin is warm and dry.   Psychiatric:   Cooperative, tearful.         ED Course   Procedures  Labs Reviewed   COMPREHENSIVE METABOLIC PANEL - Abnormal       Result Value    Sodium 137      Potassium 3.5      Chloride 106      CO2 21 (*)     Glucose 101      BUN 7      Creatinine 0.8      Calcium 9.0      Total Protein 7.2      Albumin 3.5      Total Bilirubin 0.4      Alkaline Phosphatase 83      AST 14      ALT 10      eGFR >60.0      Anion Gap 10     ISTAT PROCEDURE - Abnormal    POC PH 7.660 (*)     POC PCO2 15.3 (*)     POC PO2 162 (*)     POC HCO3 17.3 (*)     POC BE -3 (*)     POC SATURATED O2 100      POC TCO2 18 (*)     Sample ARTERIAL      Site LB      Allens Test N/A      [facility] Room Air     ISTAT PROCEDURE - Abnormal    POC PH 7.364      POC PCO2 42.8      POC PO2 32 (*)     POC HCO3 24.4      POC BE -1      POC SATURATED O2 58      POC TCO2 26      Provider Credentials: NP      Sample VENOUS      Site Other      Allens Test N/A      [facility] Nasal Can     INFLUENZA A & B BY MOLECULAR    Influenza A, Molecular Negative      Influenza B, Molecular Negative      Flu A & B Source Nasal Swab     CBC W/ AUTO  DIFFERENTIAL    WBC 9.01      RBC 4.79      Hemoglobin 13.2      Hematocrit 40.2      MCV 84      MCH 27.6      MCHC 32.8      RDW 12.6      Platelets 231      MPV 10.6      Immature Granulocytes 0.2      Gran # (ANC) 6.5      Immature Grans (Abs) 0.02      Lymph # 1.8      Mono # 0.6      Eos # 0.1      Baso # 0.01      nRBC 0      Gran % 71.7      Lymph % 20.2      Mono % 7.1      Eosinophil % 0.7      Basophil % 0.1      Differential Method Automated     SARS-COV-2 RNA AMPLIFICATION, QUAL    SARS-CoV-2 RNA, Amplification, Qual Negative     LACTIC ACID, PLASMA    Lactate (Lactic Acid) 0.6     MAGNESIUM    Magnesium 1.9     B-TYPE NATRIURETIC PEPTIDE    BNP 11     D DIMER, QUANTITATIVE    D-Dimer 0.30            Imaging Results              CT Chest Without Contrast (Final result)  Result time 12/18/24 12:01:54      Final result by Pj Lock MD (12/18/24 12:01:54)                   Impression:      1. Dependent discoid atelectasis at the lung bases without focal consolidation.  2. Splenomegaly.      Electronically signed by: Pj Lock  Date:    12/18/2024  Time:    12:01               Narrative:    EXAMINATION:  CT CHEST WITHOUT CONTRAST    CLINICAL HISTORY:  Respiratory illness, nondiagnostic xray;    TECHNIQUE:  Low dose axial images, sagittal and coronal reformations were obtained from the thoracic inlet to the lung bases. Contrast was not administered.    COMPARISON:  Chest x-ray same day.    FINDINGS:  There is dependent discoid atelectasis at the lung bases.  No focal consolidation.  No pleural or pericardial effusions.  No suspicious endobronchial lesion.  No significant axillary or intrathoracic lymphadenopathy.    Limited evaluation of the upper abdomen demonstrates splenomegaly.                                       X-Ray Chest AP Portable (Final result)  Result time 12/18/24 04:43:32      Final result by Hiram Solomon MD (12/18/24 04:43:32)                   Impression:      No acute  findings in the chest.      Electronically signed by: Hiram Solomon MD  Date:    12/18/2024  Time:    04:43               Narrative:    EXAMINATION:  XR CHEST AP PORTABLE    CLINICAL HISTORY:  Cough and wheeze;    TECHNIQUE:  Single frontal view of the chest was performed.    COMPARISON:  None    FINDINGS:  No consolidation, pleural effusion or pneumothorax.    Cardiomediastinal silhouette is unremarkable.                                       Medications   sodium chloride 0.9% flush 3 mL (has no administration in time range)   enoxaparin injection 40 mg (40 mg Subcutaneous Given 12/18/24 1631)   melatonin tablet 6 mg (has no administration in time range)   ondansetron injection 4 mg (has no administration in time range)   naloxone 0.4 mg/mL injection 0.02 mg (has no administration in time range)   glucose chewable tablet 16 g (has no administration in time range)   glucose chewable tablet 24 g (has no administration in time range)   dextrose 50% injection 12.5 g (has no administration in time range)   dextrose 50% injection 25 g (has no administration in time range)   glucagon (human recombinant) injection 1 mg (has no administration in time range)   albuterol-ipratropium 2.5 mg-0.5 mg/3 mL nebulizer solution 3 mL (3 mLs Nebulization Given 12/18/24 1056)   acetaminophen tablet 650 mg (has no administration in time range)   ketorolac tablet 10 mg (has no administration in time range)   nicotine 14 mg/24 hr 1 patch (1 patch Transdermal Patch Applied 12/18/24 1149)   sodium chloride 3% nebulizer solution 4 mL (4 mLs Nebulization Not Given 12/18/24 2100)   levalbuterol nebulizer solution 1.25 mg (1.25 mg Nebulization Given 12/19/24 0100)   cefTRIAXone injection 2 g (2 g Intravenous Given 12/18/24 1259)   ALPRAZolam tablet 0.5 mg (0.5 mg Oral Given 12/18/24 1259)   dexAMETHasone injection 8 mg (8 mg Intravenous Given 12/18/24 2155)   albuterol-ipratropium 2.5 mg-0.5 mg/3 mL nebulizer solution 3 mL (3 mLs Nebulization  Given 12/18/24 0449)   dexAMETHasone sodium phos (PF) injection 10 mg (10 mg Intramuscular Given 12/18/24 0440)   HYDROmorphone injection 2 mg (2 mg Intramuscular Given 12/18/24 0440)   ondansetron disintegrating tablet 4 mg (4 mg Oral Given 12/18/24 0440)   albuterol nebulizer solution 2.5 mg (2.5 mg Nebulization Given 12/18/24 0539)   magnesium sulfate 2g in water 50mL IVPB (premix) (0 g Intravenous Stopped 12/18/24 0829)   albuterol-ipratropium 2.5 mg-0.5 mg/3 mL nebulizer solution 3 mL (3 mLs Nebulization Given 12/18/24 0750)   lisinopriL tablet 10 mg (10 mg Oral Given 12/18/24 0830)   sodium chloride 3% nebulizer solution 4 mL (4 mLs Nebulization Given 12/18/24 1055)   potassium chloride SA CR tablet 20 mEq (20 mEq Oral Given 12/18/24 1414)     Medical Decision Making  Patient is a 39-year-old female with history of hypertension, anxiety/depression, anemia and asthma here tonight with complaints of acute onset of constant, severe right ear pain.  Patient also reports she has had a cough and wheeze for a week and is currently on doxycycline.  No trauma.  We will control symptoms and dispo accordingly.  Differential diagnosis:  Acute otitis media, bronchitis, pneumonia, asthma exacerbation.    Amount and/or Complexity of Data Reviewed  Labs: ordered.  Radiology: ordered.    Risk  Prescription drug management.               ED Course as of 12/19/24 0214   Wed Dec 18, 2024   0512 Patient's pain improved.  Patient is still tachypneic and audibly wheezing. [RJ]   0523  /92 [RJ]   0729 Re-exam: continued diffuse wheezes, rhonchi.    Patient failed walking spO2 test, after walking just 20 feet desaturated to 84% spO2. Will admit for hypoxic resp insufficiency s/t acute asthma exacerbation from likely viral trigger.  [ND]      ED Course User Index  [ND] Errol Liu MD  [RJ] Pj Hamilton MD                           Clinical Impression:  Final diagnoses:  [J96.01] Acute hypoxic respiratory  failure (Primary)  [J45.51] Severe persistent asthma with acute exacerbation  [H66.91] Acute right otitis media          ED Disposition Condition    Observation                 Pj Hamilton MD  12/19/24 0214

## 2024-12-18 NOTE — NURSING
Notified Dr. Rowe of patient's EKG result, and showed MD patient's EKG. MD voiced understanding. No new orders obtained.

## 2024-12-18 NOTE — PLAN OF CARE
Patient was seen by  at bedside. Patient reported that she has been going through a lot with her family and is currently living with her Cousin.Patient was tearful while sharing her story of her  family dynamics. Patient has been diagnosed with anxiety and depression and has been off her medications for a month. Pt reports that she has been under much stress. Patient has an appointment set for Jan 23 at [facility] in [place].   12/18/24 1232   Post-Acute Status   Post-Acute Authorization [facility]   Discharge Plan   Discharge Plan A Home with family   Discharge Plan B Home

## 2024-12-19 PROBLEM — F41.9 ANXIETY: Status: ACTIVE | Noted: 2024-12-19

## 2024-12-19 PROBLEM — B33.8 RSV (RESPIRATORY SYNCYTIAL VIRUS INFECTION): Status: ACTIVE | Noted: 2024-12-19

## 2024-12-19 LAB
ANION GAP SERPL CALC-SCNC: 10 MMOL/L (ref 8–16)
BASOPHILS # BLD AUTO: 0.01 K/UL (ref 0–0.2)
BASOPHILS NFR BLD: 0.1 % (ref 0–1.9)
BUN SERPL-MCNC: 8 MG/DL (ref 6–20)
CALCIUM SERPL-MCNC: 9.2 MG/DL (ref 8.7–10.5)
CHLORIDE SERPL-SCNC: 107 MMOL/L (ref 95–110)
CO2 SERPL-SCNC: 20 MMOL/L (ref 23–29)
CREAT SERPL-MCNC: 0.7 MG/DL (ref 0.5–1.4)
DIFFERENTIAL METHOD BLD: ABNORMAL
EOSINOPHIL # BLD AUTO: 0 K/UL (ref 0–0.5)
EOSINOPHIL NFR BLD: 0 % (ref 0–8)
ERYTHROCYTE [DISTWIDTH] IN BLOOD BY AUTOMATED COUNT: 12.9 % (ref 11.5–14.5)
EST. GFR  (NO RACE VARIABLE): >60 ML/MIN/1.73 M^2
GLUCOSE SERPL-MCNC: 125 MG/DL (ref 70–110)
HCT VFR BLD AUTO: 42.2 % (ref 37–48.5)
HGB BLD-MCNC: 13.2 G/DL (ref 12–16)
IMM GRANULOCYTES # BLD AUTO: 0.08 K/UL (ref 0–0.04)
IMM GRANULOCYTES NFR BLD AUTO: 0.5 % (ref 0–0.5)
LYMPHOCYTES # BLD AUTO: 0.6 K/UL (ref 1–4.8)
LYMPHOCYTES NFR BLD: 3.8 % (ref 18–48)
MAGNESIUM SERPL-MCNC: 2.2 MG/DL (ref 1.6–2.6)
MCH RBC QN AUTO: 27.3 PG (ref 27–31)
MCHC RBC AUTO-ENTMCNC: 31.3 G/DL (ref 32–36)
MCV RBC AUTO: 87 FL (ref 82–98)
MONOCYTES # BLD AUTO: 0.4 K/UL (ref 0.3–1)
MONOCYTES NFR BLD: 2.1 % (ref 4–15)
NEUTROPHILS # BLD AUTO: 15.4 K/UL (ref 1.8–7.7)
NEUTROPHILS NFR BLD: 93.5 % (ref 38–73)
NRBC BLD-RTO: 0 /100 WBC
OHS QRS DURATION: 86 MS
OHS QTC CALCULATION: 468 MS
PLATELET # BLD AUTO: 308 K/UL (ref 150–450)
PMV BLD AUTO: 11.1 FL (ref 9.2–12.9)
POTASSIUM SERPL-SCNC: 4.1 MMOL/L (ref 3.5–5.1)
RBC # BLD AUTO: 4.84 M/UL (ref 4–5.4)
SODIUM SERPL-SCNC: 137 MMOL/L (ref 136–145)
WBC # BLD AUTO: 16.49 K/UL (ref 3.9–12.7)

## 2024-12-19 PROCEDURE — 27000221 HC OXYGEN, UP TO 24 HOURS

## 2024-12-19 PROCEDURE — 94761 N-INVAS EAR/PLS OXIMETRY MLT: CPT

## 2024-12-19 PROCEDURE — 25000003 PHARM REV CODE 250: Performed by: NURSE PRACTITIONER

## 2024-12-19 PROCEDURE — 27000207 HC ISOLATION

## 2024-12-19 PROCEDURE — 83735 ASSAY OF MAGNESIUM: CPT | Performed by: NURSE PRACTITIONER

## 2024-12-19 PROCEDURE — 85025 COMPLETE CBC W/AUTO DIFF WBC: CPT | Performed by: NURSE PRACTITIONER

## 2024-12-19 PROCEDURE — 93005 ELECTROCARDIOGRAM TRACING: CPT

## 2024-12-19 PROCEDURE — 63600175 PHARM REV CODE 636 W HCPCS: Mod: UD | Performed by: STUDENT IN AN ORGANIZED HEALTH CARE EDUCATION/TRAINING PROGRAM

## 2024-12-19 PROCEDURE — 80048 BASIC METABOLIC PNL TOTAL CA: CPT | Performed by: NURSE PRACTITIONER

## 2024-12-19 PROCEDURE — 21400001 HC TELEMETRY ROOM

## 2024-12-19 PROCEDURE — 94640 AIRWAY INHALATION TREATMENT: CPT

## 2024-12-19 PROCEDURE — 25000242 PHARM REV CODE 250 ALT 637 W/ HCPCS: Performed by: NURSE PRACTITIONER

## 2024-12-19 PROCEDURE — 25000242 PHARM REV CODE 250 ALT 637 W/ HCPCS: Performed by: STUDENT IN AN ORGANIZED HEALTH CARE EDUCATION/TRAINING PROGRAM

## 2024-12-19 PROCEDURE — 63600175 PHARM REV CODE 636 W HCPCS: Mod: UD | Performed by: NURSE PRACTITIONER

## 2024-12-19 PROCEDURE — S4991 NICOTINE PATCH NONLEGEND: HCPCS | Performed by: NURSE PRACTITIONER

## 2024-12-19 RX ORDER — TRAMADOL HYDROCHLORIDE 50 MG/1
50 TABLET ORAL ONCE
Status: COMPLETED | OUTPATIENT
Start: 2024-12-19 | End: 2024-12-19

## 2024-12-19 RX ORDER — GUAIFENESIN 100 MG/5ML
200 SOLUTION ORAL EVERY 4 HOURS PRN
Status: DISCONTINUED | OUTPATIENT
Start: 2024-12-19 | End: 2024-12-20 | Stop reason: HOSPADM

## 2024-12-19 RX ORDER — IPRATROPIUM BROMIDE 0.5 MG/2.5ML
0.5 SOLUTION RESPIRATORY (INHALATION) EVERY 8 HOURS
Status: DISCONTINUED | OUTPATIENT
Start: 2024-12-19 | End: 2024-12-20 | Stop reason: HOSPADM

## 2024-12-19 RX ORDER — IBUPROFEN 400 MG/1
400 TABLET ORAL EVERY 6 HOURS PRN
Status: DISCONTINUED | OUTPATIENT
Start: 2024-12-19 | End: 2024-12-20 | Stop reason: HOSPADM

## 2024-12-19 RX ORDER — SODIUM CHLORIDE, SODIUM LACTATE, POTASSIUM CHLORIDE, CALCIUM CHLORIDE 600; 310; 30; 20 MG/100ML; MG/100ML; MG/100ML; MG/100ML
INJECTION, SOLUTION INTRAVENOUS CONTINUOUS
Status: ACTIVE | OUTPATIENT
Start: 2024-12-19 | End: 2024-12-20

## 2024-12-19 RX ORDER — NICOTINE 7MG/24HR
1 PATCH, TRANSDERMAL 24 HOURS TRANSDERMAL DAILY
Status: DISCONTINUED | OUTPATIENT
Start: 2024-12-19 | End: 2024-12-20 | Stop reason: HOSPADM

## 2024-12-19 RX ADMIN — Medication 6 MG: at 09:12

## 2024-12-19 RX ADMIN — CEFTRIAXONE SODIUM 2 G: 1 INJECTION, POWDER, FOR SOLUTION INTRAMUSCULAR; INTRAVENOUS at 01:12

## 2024-12-19 RX ADMIN — ENOXAPARIN SODIUM 40 MG: 40 INJECTION SUBCUTANEOUS at 05:12

## 2024-12-19 RX ADMIN — TRAMADOL HYDROCHLORIDE 50 MG: 50 TABLET, COATED ORAL at 03:12

## 2024-12-19 RX ADMIN — LEVALBUTEROL 1.25 MG: 1.25 SOLUTION, CONCENTRATE RESPIRATORY (INHALATION) at 01:12

## 2024-12-19 RX ADMIN — ACETAMINOPHEN 650 MG: 325 TABLET ORAL at 10:12

## 2024-12-19 RX ADMIN — LEVALBUTEROL 1.25 MG: 1.25 SOLUTION, CONCENTRATE RESPIRATORY (INHALATION) at 08:12

## 2024-12-19 RX ADMIN — SODIUM CHLORIDE SOLN NEBU 3% 4 ML: 3 NEBU SOLN at 08:12

## 2024-12-19 RX ADMIN — LEVALBUTEROL 1.25 MG: 1.25 SOLUTION, CONCENTRATE RESPIRATORY (INHALATION) at 11:12

## 2024-12-19 RX ADMIN — LEVALBUTEROL 1.25 MG: 1.25 SOLUTION, CONCENTRATE RESPIRATORY (INHALATION) at 04:12

## 2024-12-19 RX ADMIN — DEXAMETHASONE SODIUM PHOSPHATE 8 MG: 4 INJECTION INTRA-ARTICULAR; INTRALESIONAL; INTRAMUSCULAR; INTRAVENOUS; SOFT TISSUE at 09:12

## 2024-12-19 RX ADMIN — IPRATROPIUM BROMIDE 0.5 MG: 0.5 SOLUTION RESPIRATORY (INHALATION) at 04:12

## 2024-12-19 RX ADMIN — IPRATROPIUM BROMIDE 0.5 MG: 0.5 SOLUTION RESPIRATORY (INHALATION) at 11:12

## 2024-12-19 RX ADMIN — GUAIFENESIN 200 MG: 300 SOLUTION ORAL at 10:12

## 2024-12-19 RX ADMIN — SODIUM CHLORIDE, POTASSIUM CHLORIDE, SODIUM LACTATE AND CALCIUM CHLORIDE: 600; 310; 30; 20 INJECTION, SOLUTION INTRAVENOUS at 06:12

## 2024-12-19 RX ADMIN — NICOTINE 1 PATCH: 7 PATCH, EXTENDED RELEASE TRANSDERMAL at 01:12

## 2024-12-19 NOTE — CARE UPDATE
12/19/24 1700   Patient Assessment/Suction   Level of Consciousness (AVPU) alert   Respiratory Effort Mild   Expansion/Accessory Muscles/Retractions no use of accessory muscles;no retractions;expansion symmetric   All Lung Fields Breath Sounds Anterior:;Lateral:;wheezes, expiratory;diminished   Rhythm/Pattern, Respiratory unlabored;pattern regular;depth regular   Cough Frequency infrequent   Cough Type good   PRE-TX-O2   Device (Oxygen Therapy) nasal cannula   Flow (L/min) (Oxygen Therapy) 2   Positioning   Head of Bed (HOB) Positioning HOB elevated   Aerosol Therapy   $ Aerosol Therapy Charges Aerosol Treatment   Respiratory Treatment Status (SVN) given   Treatment Route (SVN) mouthpiece utilized;oxygen   Patient Position HOB elevated   Post Treatment Assessment (SVN) breath sounds unchanged   Signs of Intolerance (SVN) none   Breath Sounds Post-Respiratory Treatment   Throughout All Fields Post-Treatment All Fields   Throughout All Fields Post-Treatment Anterior:;Posterior:;Lateral:

## 2024-12-19 NOTE — CARE UPDATE
12/19/24 0857   Patient Assessment/Suction   Level of Consciousness (AVPU) alert   Respiratory Effort Mild   Expansion/Accessory Muscles/Retractions expansion symmetric   All Lung Fields Breath Sounds Anterior:;Lateral:;Posterior:;wheezes, expiratory;coarse   Rhythm/Pattern, Respiratory pattern regular;depth regular   Cough Frequency frequent   Cough Type good;productive   PRE-TX-O2   Device (Oxygen Therapy) nasal cannula   $ Is the patient on Low Flow Oxygen? Yes   Flow (L/min) (Oxygen Therapy) 2   SpO2 95 %   Pulse Oximetry Type Intermittent   $ Pulse Oximetry - Multiple Charge Pulse Oximetry - Multiple   Pulse 87   Resp 16   Positioning   Body Position sitting up in bed   Head of Bed (HOB) Positioning HOB elevated   Aerosol Therapy   $ Aerosol Therapy Charges Aerosol Treatment   Daily Review of Necessity (SVN) completed   Respiratory Treatment Status (SVN) given   Treatment Route (SVN) mouthpiece utilized;oxygen   Patient Position HOB elevated   Post Treatment Assessment (SVN) breath sounds improved   Signs of Intolerance (SVN) none   Breath Sounds Post-Respiratory Treatment   Throughout All Fields Post-Treatment All Fields   Throughout All Fields Post-Treatment Anterior:;Posterior:;Lateral:;aeration increased

## 2024-12-19 NOTE — SUBJECTIVE & OBJECTIVE
Interval History: Patient still wheezing and very course. Ipratropium nebs added to RT regimen. Sinus arrhythmia noted on telemetry, confirmed with EKG. No ischemic changes noted.    Review of Systems   Constitutional:  Negative for chills and fever.   HENT:  Positive for ear pain. Negative for congestion and trouble swallowing.    Eyes:  Negative for visual disturbance.   Respiratory:  Positive for cough, shortness of breath and wheezing.    Cardiovascular:  Negative for chest pain.   Gastrointestinal:  Negative for abdominal pain, nausea and vomiting.   Genitourinary:  Negative for dysuria.   Musculoskeletal:  Positive for myalgias.   Skin:  Negative for color change.   Neurological:  Negative for dizziness, speech difficulty and light-headedness.   Psychiatric/Behavioral:  Negative for agitation and confusion. The patient is not nervous/anxious.      Objective:     Vital Signs (Most Recent):  Temp: 98 °F (36.7 °C) (12/19/24 0755)  Pulse: 90 (12/19/24 1032)  Resp: 16 (12/19/24 0857)  BP: 114/70 (12/19/24 1032)  SpO2: 97 % (12/19/24 1032) Vital Signs (24h Range):  Temp:  [97.7 °F (36.5 °C)-98.1 °F (36.7 °C)] 98 °F (36.7 °C)  Pulse:  [] 90  Resp:  [16-20] 16  SpO2:  [95 %-98 %] 97 %  BP: (109-148)/(69-84) 114/70     Weight: 82 kg (180 lb 12.4 oz)  Body mass index is 36.51 kg/m².    Intake/Output Summary (Last 24 hours) at 12/19/2024 1046  Last data filed at 12/18/2024 1700  Gross per 24 hour   Intake 500 ml   Output --   Net 500 ml         Physical Exam  Constitutional:       General: She is not in acute distress.     Appearance: She is ill-appearing.   HENT:      Head: Normocephalic and atraumatic.      Mouth/Throat:      Mouth: Mucous membranes are moist.   Eyes:      Extraocular Movements: Extraocular movements intact.      Pupils: Pupils are equal, round, and reactive to light.   Cardiovascular:      Rate and Rhythm: Normal rate and regular rhythm.   Pulmonary:      Effort: Pulmonary effort is normal. No  respiratory distress.      Breath sounds: No stridor. Wheezing and rhonchi present. No rales.   Abdominal:      General: Bowel sounds are normal. There is no distension.      Palpations: Abdomen is soft.      Tenderness: There is no abdominal tenderness. There is no guarding or rebound.   Musculoskeletal:      Cervical back: No rigidity.      Right lower leg: No edema.      Left lower leg: No edema.   Skin:     General: Skin is warm.      Coloration: Skin is pale.   Neurological:      Mental Status: She is alert and oriented to person, place, and time.   Psychiatric:         Mood and Affect: Mood normal.         Behavior: Behavior normal.         Thought Content: Thought content normal.         Judgment: Judgment normal.             Significant Labs: All pertinent labs within the past 24 hours have been reviewed.    Significant Imaging: I have reviewed all pertinent imaging results/findings within the past 24 hours.

## 2024-12-19 NOTE — PROGRESS NOTES
[facility] Medicine  Progress Note    Patient Name: Yenny Willoughby  MRN: [id number]  Patient Class: IP- Inpatient   Admission Date: 12/18/2024  Length of Stay: 1 days  Attending Physician: Juliano Rowe MD  Primary Care Provider: Nevaeh, Primary Doctor        Subjective     Principal Problem:Acute hypoxic respiratory failure        HPI:  Yenny Willoughby is a 39 year old female w/ PMH asthma, tobacco abuse, anemia, HTN not on medication, anxiety, and depression. She reports that she has been experiencing worsening wheezing for a week so she went to urgent care 12/17 and was diagnosed with bronchitis and sent home with a prescription for doxycycline. She presented to the ED with severe right ear pain with bloody drainage and continued wheezing. She also reports chills but denies fever. She denies sinus pressure, sore throat, productive cough, nausea, or vomiting.     Overview/Hospital Course:  Yenny Willoughby is a 39 year old female w/ PMH asthma, tobacco abuse, anemia, HTN, anxiety, and depression. She was admitted to the hospital with an asthma exacerbation. She tested negative for flu and COVID but positive for RSV. She was treated with duo-nebs,  dexamethasone, magnesium, hypertonic saline, and abx for suspected PNA and otitis media. She developed tachycardia so albuterol was changed to xopenex. She developed worsening ear pain and was concerned as her family member had an ear tumor, so ENT was consulted.    Interval History: Patient still wheezing and very course. Ipratropium nebs added to RT regimen. Sinus arrhythmia noted on telemetry, confirmed with EKG. No ischemic changes noted.    Review of Systems   Constitutional:  Negative for chills and fever.   HENT:  Positive for ear pain. Negative for congestion and trouble swallowing.    Eyes:  Negative for visual disturbance.   Respiratory:  Positive for cough, shortness of breath and wheezing.    Cardiovascular:  Negative for chest pain.    Gastrointestinal:  Negative for abdominal pain, nausea and vomiting.   Genitourinary:  Negative for dysuria.   Musculoskeletal:  Positive for myalgias.   Skin:  Negative for color change.   Neurological:  Negative for dizziness, speech difficulty and light-headedness.   Psychiatric/Behavioral:  Negative for agitation and confusion. The patient is not nervous/anxious.      Objective:     Vital Signs (Most Recent):  Temp: 98 °F (36.7 °C) (12/19/24 0755)  Pulse: 90 (12/19/24 1032)  Resp: 16 (12/19/24 0857)  BP: 114/70 (12/19/24 1032)  SpO2: 97 % (12/19/24 1032) Vital Signs (24h Range):  Temp:  [97.7 °F (36.5 °C)-98.1 °F (36.7 °C)] 98 °F (36.7 °C)  Pulse:  [] 90  Resp:  [16-20] 16  SpO2:  [95 %-98 %] 97 %  BP: (109-148)/(69-84) 114/70     Weight: 82 kg (180 lb 12.4 oz)  Body mass index is 36.51 kg/m².    Intake/Output Summary (Last 24 hours) at 12/19/2024 1046  Last data filed at 12/18/2024 1700  Gross per 24 hour   Intake 500 ml   Output --   Net 500 ml         Physical Exam  Constitutional:       General: She is not in acute distress.     Appearance: She is ill-appearing.   HENT:      Head: Normocephalic and atraumatic.      Mouth/Throat:      Mouth: Mucous membranes are moist.   Eyes:      Extraocular Movements: Extraocular movements intact.      Pupils: Pupils are equal, round, and reactive to light.   Cardiovascular:      Rate and Rhythm: Normal rate and regular rhythm.   Pulmonary:      Effort: Pulmonary effort is normal. No respiratory distress.      Breath sounds: No stridor. Wheezing and rhonchi present. No rales.   Abdominal:      General: Bowel sounds are normal. There is no distension.      Palpations: Abdomen is soft.      Tenderness: There is no abdominal tenderness. There is no guarding or rebound.   Musculoskeletal:      Cervical back: No rigidity.      Right lower leg: No edema.      Left lower leg: No edema.   Skin:     General: Skin is warm.      Coloration: Skin is pale.   Neurological:       Mental Status: She is alert and oriented to person, place, and time.   Psychiatric:         Mood and Affect: Mood normal.         Behavior: Behavior normal.         Thought Content: Thought content normal.         Judgment: Judgment normal.             Significant Labs: All pertinent labs within the past 24 hours have been reviewed.    Significant Imaging: I have reviewed all pertinent imaging results/findings within the past 24 hours.    Assessment and Plan     * Acute hypoxic respiratory failure  Patient with Hypoxic Respiratory failure which is Acute.  she is not on home oxygen. Supplemental oxygen was provided and noted-      .   Signs/symptoms of respiratory failure include- tachypnea, increased work of breathing, and wheezing. Contributing diagnoses includes -  asthma, bronchitis, RSV  Labs and images were reviewed. Patient Has recent ABG, which has been reviewed. Will treat underlying causes and adjust management of respiratory failure as follows- breathing tx, steroids, supplemental O2    Attempt made to ambulate patient on RA but she desatted to 84%, RR 30, and   Still with wheezing despite tx, however no longer tachypneic or tachycardic  Atelectasis noted on CT but no consolidation  Rocephin started to cover for possible early PN  Continue breathing tx, add ipratropium  Continue xanax for anxiety  Flu and COVID negative  RSV positive    Anxiety  Patient was on anxiety meds until about a month ago  Telepsych consulted, appreciate recs  Recommendations:   - patient reports that she has mental health f/u scheduled for 12/23; patient agreeable to seeing a psychotherapist and a psychiatrist  - upon discharge consider giving the  patient a prescription for 5 tablets of Xanax 0.5 mg to take PRN for anxiety    RSV (respiratory syncytial virus infection)  Supportive care  PRN O2  Breathing tx      Acute asthma exacerbation  Duo-neb treatments  Supplemental O2  Steroids  Chest CT   1. Dependent discoid  "atelectasis at the lung bases without focal consolidation.  2. Splenomegaly.  Abx changed to rocephin to cover for possible early PNA          Otitis media  Right ear pain with dried blood noted in the canal  It appears that there is a scratch in the canal where the blood is originating  TM erythematous but no perforation noted  ENT consulted given worsening pain after "popping" and patient's concerns given family history of ear tumor  Augmentin changed to Rocephin for lung coverage  PRN toradol for pain      Tobacco abuse  Nicotine patch  Encourage cessation      Hypertension  Patient's blood pressure range in the last 24 hours was: BP  Min: 126/82  Max: 177/107.The patient's inpatient anti-hypertensive regimen is listed below:  Current Antihypertensives       Plan  - BP is uncontrolled, will adjust as follows: 1x dose lisinopril given for /107  - Patient does not take BP meds outpatient, but if she requires more treatment while in the hospital she may need to be discharged on meds      VTE Risk Mitigation (From admission, onward)           Ordered     enoxaparin injection 40 mg  Daily         12/18/24 0948     IP VTE HIGH RISK PATIENT  Once         12/18/24 0948     Place sequential compression device  Until discontinued         12/18/24 0948                    Discharge Planning   IRA: 12/21/2024     Code Status: Full Code   Medical Readiness for Discharge Date:   Discharge Plan A: Home with family                        Meaghan Estrada NP  Department of Hospital Medicine   [facility]    "

## 2024-12-19 NOTE — HOSPITAL COURSE
Yenny Willoughby is a 39 year old female w/ PMH asthma, tobacco abuse, anemia, HTN, anxiety, and depression. She was admitted to the hospital with an asthma exacerbation. She tested negative for flu and COVID but positive for RSV. She was treated with duo-nebs,  dexamethasone, magnesium, hypertonic saline, and abx for suspected PNA and otitis media. She developed tachycardia so albuterol was changed to xopenex. She developed worsening ear pain and was concerned as her family member had an ear tumor, so ENT was consulted.  Overnight, she was able to be weaned from oxygen.  Antibiotics, steroids, and breathing treatments were sent to her pharmacy prior to discharge.  Discharge instructions as well as return precautions were discussed with patient with good understanding.  Patient was seen and evaluated on day of discharge and deemed appropriate.

## 2024-12-19 NOTE — ASSESSMENT & PLAN NOTE
"Right ear pain with dried blood noted in the canal  It appears that there is a scratch in the canal where the blood is originating  TM erythematous but no perforation noted  ENT consulted given worsening pain after "popping" and patient's concerns given family history of ear tumor  Augmentin changed to Rocephin for lung coverage  PRN toradol for pain    "

## 2024-12-19 NOTE — ASSESSMENT & PLAN NOTE
Patient was on anxiety meds until about a month ago  Telepsych consulted, appreciate recs  Recommendations:   - patient reports that she has mental health f/u scheduled for 12/23; patient agreeable to seeing a psychotherapist and a psychiatrist  - upon discharge consider giving the  patient a prescription for 5 tablets of Xanax 0.5 mg to take PRN for anxiety

## 2024-12-20 VITALS
HEART RATE: 110 BPM | HEIGHT: 59 IN | TEMPERATURE: 98 F | DIASTOLIC BLOOD PRESSURE: 74 MMHG | SYSTOLIC BLOOD PRESSURE: 135 MMHG | BODY MASS INDEX: 36.72 KG/M2 | WEIGHT: 182.13 LBS | OXYGEN SATURATION: 93 % | RESPIRATION RATE: 12 BRPM

## 2024-12-20 PROBLEM — F17.200 TOBACCO DEPENDENCY: Status: ACTIVE | Noted: 2024-12-20

## 2024-12-20 LAB
ANION GAP SERPL CALC-SCNC: 9 MMOL/L (ref 8–16)
BASOPHILS # BLD AUTO: 0.01 K/UL (ref 0–0.2)
BASOPHILS NFR BLD: 0.1 % (ref 0–1.9)
BUN SERPL-MCNC: 13 MG/DL (ref 6–20)
CALCIUM SERPL-MCNC: 9.1 MG/DL (ref 8.7–10.5)
CHLORIDE SERPL-SCNC: 110 MMOL/L (ref 95–110)
CO2 SERPL-SCNC: 19 MMOL/L (ref 23–29)
CREAT SERPL-MCNC: 0.7 MG/DL (ref 0.5–1.4)
DIFFERENTIAL METHOD BLD: ABNORMAL
EOSINOPHIL # BLD AUTO: 0 K/UL (ref 0–0.5)
EOSINOPHIL NFR BLD: 0 % (ref 0–8)
ERYTHROCYTE [DISTWIDTH] IN BLOOD BY AUTOMATED COUNT: 13.2 % (ref 11.5–14.5)
EST. GFR  (NO RACE VARIABLE): >60 ML/MIN/1.73 M^2
GLUCOSE SERPL-MCNC: 122 MG/DL (ref 70–110)
HCT VFR BLD AUTO: 41.6 % (ref 37–48.5)
HGB BLD-MCNC: 12.8 G/DL (ref 12–16)
IMM GRANULOCYTES # BLD AUTO: 0.17 K/UL (ref 0–0.04)
IMM GRANULOCYTES NFR BLD AUTO: 1 % (ref 0–0.5)
LYMPHOCYTES # BLD AUTO: 0.9 K/UL (ref 1–4.8)
LYMPHOCYTES NFR BLD: 5.1 % (ref 18–48)
MAGNESIUM SERPL-MCNC: 2.1 MG/DL (ref 1.6–2.6)
MCH RBC QN AUTO: 27.4 PG (ref 27–31)
MCHC RBC AUTO-ENTMCNC: 30.8 G/DL (ref 32–36)
MCV RBC AUTO: 89 FL (ref 82–98)
MONOCYTES # BLD AUTO: 0.5 K/UL (ref 0.3–1)
MONOCYTES NFR BLD: 3 % (ref 4–15)
NEUTROPHILS # BLD AUTO: 15.3 K/UL (ref 1.8–7.7)
NEUTROPHILS NFR BLD: 90.8 % (ref 38–73)
NRBC BLD-RTO: 0 /100 WBC
OHS QRS DURATION: 84 MS
OHS QTC CALCULATION: 437 MS
PLATELET # BLD AUTO: 320 K/UL (ref 150–450)
PMV BLD AUTO: 11.2 FL (ref 9.2–12.9)
POTASSIUM SERPL-SCNC: 4.1 MMOL/L (ref 3.5–5.1)
RBC # BLD AUTO: 4.67 M/UL (ref 4–5.4)
SODIUM SERPL-SCNC: 138 MMOL/L (ref 136–145)
WBC # BLD AUTO: 16.82 K/UL (ref 3.9–12.7)

## 2024-12-20 PROCEDURE — 83735 ASSAY OF MAGNESIUM: CPT | Performed by: NURSE PRACTITIONER

## 2024-12-20 PROCEDURE — 80048 BASIC METABOLIC PNL TOTAL CA: CPT | Performed by: NURSE PRACTITIONER

## 2024-12-20 PROCEDURE — 25000003 PHARM REV CODE 250: Performed by: NURSE PRACTITIONER

## 2024-12-20 PROCEDURE — 25000242 PHARM REV CODE 250 ALT 637 W/ HCPCS: Performed by: STUDENT IN AN ORGANIZED HEALTH CARE EDUCATION/TRAINING PROGRAM

## 2024-12-20 PROCEDURE — 99239 HOSP IP/OBS DSCHRG MGMT >30: CPT | Mod: ,,, | Performed by: NURSE PRACTITIONER

## 2024-12-20 PROCEDURE — S4991 NICOTINE PATCH NONLEGEND: HCPCS | Performed by: NURSE PRACTITIONER

## 2024-12-20 PROCEDURE — 94640 AIRWAY INHALATION TREATMENT: CPT

## 2024-12-20 PROCEDURE — 94761 N-INVAS EAR/PLS OXIMETRY MLT: CPT

## 2024-12-20 PROCEDURE — 63600175 PHARM REV CODE 636 W HCPCS: Mod: UD | Performed by: STUDENT IN AN ORGANIZED HEALTH CARE EDUCATION/TRAINING PROGRAM

## 2024-12-20 PROCEDURE — 25000242 PHARM REV CODE 250 ALT 637 W/ HCPCS: Performed by: NURSE PRACTITIONER

## 2024-12-20 PROCEDURE — 85025 COMPLETE CBC W/AUTO DIFF WBC: CPT | Performed by: NURSE PRACTITIONER

## 2024-12-20 RX ORDER — LEVALBUTEROL 1.25 MG/.5ML
1.25 SOLUTION, CONCENTRATE RESPIRATORY (INHALATION) EVERY 8 HOURS PRN
Qty: 60 EACH | Refills: 0 | Status: SHIPPED | OUTPATIENT
Start: 2024-12-20 | End: 2025-01-19

## 2024-12-20 RX ORDER — CEFDINIR 300 MG/1
300 CAPSULE ORAL 2 TIMES DAILY
Qty: 20 CAPSULE | Refills: 0 | Status: SHIPPED | OUTPATIENT
Start: 2024-12-20 | End: 2024-12-30

## 2024-12-20 RX ORDER — NICOTINE 7MG/24HR
1 PATCH, TRANSDERMAL 24 HOURS TRANSDERMAL DAILY
Qty: 14 PATCH | Refills: 1 | Status: SHIPPED | OUTPATIENT
Start: 2024-12-20

## 2024-12-20 RX ORDER — METHYLPREDNISOLONE 4 MG/1
TABLET ORAL
Qty: 21 EACH | Refills: 0 | Status: SHIPPED | OUTPATIENT
Start: 2024-12-20 | End: 2025-01-10

## 2024-12-20 RX ORDER — GUAIFENESIN 100 MG/5ML
200 SOLUTION ORAL EVERY 4 HOURS PRN
Qty: 236 ML | Refills: 0 | Status: SHIPPED | OUTPATIENT
Start: 2024-12-20 | End: 2024-12-30

## 2024-12-20 RX ADMIN — IPRATROPIUM BROMIDE 0.5 MG: 0.5 SOLUTION RESPIRATORY (INHALATION) at 07:12

## 2024-12-20 RX ADMIN — NICOTINE 1 PATCH: 7 PATCH, EXTENDED RELEASE TRANSDERMAL at 09:12

## 2024-12-20 RX ADMIN — DEXAMETHASONE SODIUM PHOSPHATE 8 MG: 4 INJECTION INTRA-ARTICULAR; INTRALESIONAL; INTRAMUSCULAR; INTRAVENOUS; SOFT TISSUE at 09:12

## 2024-12-20 RX ADMIN — LEVALBUTEROL 1.25 MG: 1.25 SOLUTION, CONCENTRATE RESPIRATORY (INHALATION) at 07:12

## 2024-12-20 NOTE — PHYSICIAN QUERY
Due to the conflicting clinical picture, please clinically validate the diagnosis of acute hypoxic respiratory failure. If validated, please provide additional clinical support for the diagnosis.  The respiratory condition is confirmed. Additional clinical support/decision making indicators for the diagnosis include (please specify): hypoxic on 12/18/24 0719

## 2024-12-20 NOTE — PLAN OF CARE
spoke with Jessica at [facility] for approval . provided rental agreement it is a 13 month rental and Medicaid Insurance should cover but if they are unable to pay the full amount for it. Pt would be responsible for the sale amount left according to Jessica REP for DME.

## 2024-12-20 NOTE — PLAN OF CARE
[facility] - Med Surg  Discharge Final Note    Primary Care Provider: Nevaeh, Primary Doctor    Expected Discharge Date: 12/20/2024    Verbal follow up appointment Christina Cao NP with [facility] provided to patient. Demonstrated understanding by verbal feedback. She already has follow up with psych.  will provide her with nebulizer. OK for DC once nebulizer is given to patient. She will have a ride home.    Final Discharge Note (most recent)       Final Note - 12/20/24 0928          Final Note    Assessment Type Final Discharge Note     Anticipated Discharge Disposition Home or Self Care     What phone number can be called within the next 1-3 days to see how you are doing after discharge? [phone]     Hospital Resources/Appts/Education Provided Appointments scheduled and added to AVS        Post-Acute Status    Post-Acute Authorization E     E Status Set-up Complete/Auth obtained     Discharge Delays None known at this time                     Important Message from Medicare             Contact Info       [facility]    [address] [place] [address]  [phone]       Next Steps: Go on 12/27/2024    Instructions: appointment Friday Dec 27th at 10:00am for hospital follow up with Christina Cao NP

## 2024-12-20 NOTE — DISCHARGE SUMMARY
[facility] Medicine  Discharge Summary      Patient Name: Yenny Willoughby  MRN: [id number]  KEATON: [id number]  Patient Class: IP- Inpatient  Admission Date: 12/18/2024  Hospital Length of Stay: 2 days  Discharge Date and Time:  12/20/2024 1:07 PM  Attending Physician: Juliano Rowe MD   Discharging Provider: Dalila Teague NP  Primary Care Provider: Nevaeh Primary Doctor    Primary Care Team: Networked reference to record PCT     HPI:   Yenny Willoughby is a 39 year old female w/ PMH asthma, tobacco abuse, anemia, HTN not on medication, anxiety, and depression. She reports that she has been experiencing worsening wheezing for a week so she went to urgent care 12/17 and was diagnosed with bronchitis and sent home with a prescription for doxycycline. She presented to the ED with severe right ear pain with bloody drainage and continued wheezing. She also reports chills but denies fever. She denies sinus pressure, sore throat, productive cough, nausea, or vomiting.     * No surgery found *      Hospital Course:   Yenny Willoughby is a 39 year old female w/ PMH asthma, tobacco abuse, anemia, HTN, anxiety, and depression. She was admitted to the hospital with an asthma exacerbation. She tested negative for flu and COVID but positive for RSV. She was treated with duo-nebs,  dexamethasone, magnesium, hypertonic saline, and abx for suspected PNA and otitis media. She developed tachycardia so albuterol was changed to xopenex. She developed worsening ear pain and was concerned as her family member had an ear tumor, so ENT was consulted.  Overnight, she was able to be weaned from oxygen.  Antibiotics, steroids, and breathing treatments were sent to her pharmacy prior to discharge.  Discharge instructions as well as return precautions were discussed with patient with good understanding.  Patient was seen and evaluated on day of discharge and deemed appropriate.     Goals of Care Treatment Preferences:  Code  Status: Full Code      SDOH Screening:  The patient was screened for food insecurity, housing instability, transportation needs, utility difficulties, and interpersonal safety. The social determinant(s) of health identified as a concern this admission are:  Housing instability    The plan to address these concerns is:  consulted    Social Drivers of Health with Concerns     Housing Stability: High Risk (12/20/2024)        Consults:   Consults (From admission, onward)          Status Ordering Provider     Inpatient consult to Otolaryngology/ENT  Once        Provider:  Jose Martin Monae MD    Acknowledged RADHA RODRIGUEZ     Inpatient consult to Telemedicine - Psyc  Once        Provider:  (Not yet assigned)    Acknowledged RADHA RODRIGUEZ            * Acute hypoxic respiratory failure  Patient with Hypoxic Respiratory failure which is Acute.  she is not on home oxygen. Supplemental oxygen was provided and noted-      .   Signs/symptoms of respiratory failure include- tachypnea, increased work of breathing, and wheezing. Contributing diagnoses includes -  asthma, bronchitis, RSV  Labs and images were reviewed. Patient Has recent ABG, which has been reviewed. Will treat underlying causes and adjust management of respiratory failure as follows- breathing tx, steroids, supplemental O2    Attempt made to ambulate patient on RA but she desatted to 84%, RR 30, and   Still with wheezing despite tx, however no longer tachypneic or tachycardic  Atelectasis noted on CT but no consolidation  Rocephin started to cover for possible early PN  Continue breathing tx, add ipratropium  Continue xanax for anxiety  Flu and COVID negative  RSV positive    Anxiety  Patient was on anxiety meds until about a month ago  Telepsych consulted, appreciate recs  Recommendations:   - patient reports that she has mental health f/u scheduled for 12/23; patient agreeable to seeing a psychotherapist and a psychiatrist  - upon discharge  "consider giving the  patient a prescription for 5 tablets of Xanax 0.5 mg to take PRN for anxiety    RSV (respiratory syncytial virus infection)  Supportive care  PRN O2  Breathing tx      Acute asthma exacerbation  Duo-neb treatments  Supplemental O2  Steroids  Chest CT   1. Dependent discoid atelectasis at the lung bases without focal consolidation.  2. Splenomegaly.  Abx changed to rocephin to cover for possible early PNA          Otitis media  Right ear pain with dried blood noted in the canal  It appears that there is a scratch in the canal where the blood is originating  TM erythematous but no perforation noted  ENT consulted given worsening pain after "popping" and patient's concerns given family history of ear tumor  Augmentin changed to Rocephin for lung coverage  PRN toradol for pain      Tobacco abuse  Nicotine patch  Encourage cessation      Hypertension  Patient's blood pressure range in the last 24 hours was: BP  Min: 126/82  Max: 177/107.The patient's inpatient anti-hypertensive regimen is listed below:  Current Antihypertensives       Plan  - BP is uncontrolled, will adjust as follows: 1x dose lisinopril given for /107  - Patient does not take BP meds outpatient, but if she requires more treatment while in the hospital she may need to be discharged on meds      Final Active Diagnoses:    Diagnosis Date Noted POA    PRINCIPAL PROBLEM:  Acute hypoxic respiratory failure [J96.01] 12/18/2024 Yes    Tobacco dependency [F17.200] 12/20/2024 Unknown    RSV (respiratory syncytial virus infection) [B33.8] 12/19/2024 Yes    Anxiety [F41.9] 12/19/2024 Yes    Tobacco abuse [Z72.0] 12/18/2024 Yes    Otitis media [H66.90] 12/18/2024 Yes    Acute asthma exacerbation [J45.901] 12/18/2024 Yes    Hypertension [I10]  Yes      Problems Resolved During this Admission:       Discharged Condition: good    Disposition: Home or Self Care    Follow Up:   Follow-up Information       [facility]. Go on " "12/27/2024.    Why: appointment Friday Dec 27th at 10:00am for hospital follow up with Christina Cao NP  Contact information:  [address] [place] [address] [phone]                         Patient Instructions:      NEBULIZER KIT (SUPPLIES) FOR HOME USE     Order Specific Question Answer Comments   Height: 4' 11" (1.499 m)    Weight: 82.6 kg (182 lb 1.6 oz)    Does patient have medical equipment at home? none recently borrowed her cousin's nebulizer   Length of need (1-99 months): 99    Mask or Mouthpiece? Mouthpiece      NEBULIZER FOR HOME USE     Order Specific Question Answer Comments   Height: 4' 11" (1.499 m)    Weight: 82.6 kg (182 lb 1.6 oz)    Does patient have medical equipment at home? none recently borrowed her cousin's nebulizer   Length of need (1-99 months): 99      Ambulatory referral/consult to Smoking Cessation Program   Standing Status: Future   Referral Priority: Routine Referral Type: Consultation   Referral Reason: Specialty Services Required   Requested Specialty: CTTS   Number of Visits Requested: 1     Diet Adult Regular     Notify your health care provider if you experience any of the following:  temperature >100.4     Notify your health care provider if you experience any of the following:  persistent nausea and vomiting or diarrhea     Notify your health care provider if you experience any of the following:  severe uncontrolled pain     Notify your health care provider if you experience any of the following:  difficulty breathing or increased cough     Notify your health care provider if you experience any of the following:  severe persistent headache     Notify your health care provider if you experience any of the following:  persistent dizziness, light-headedness, or visual disturbances     Notify your health care provider if you experience any of the following:  increased confusion or weakness     Activity as tolerated       Significant Diagnostic Studies: Labs: CMP "   Recent Labs   Lab 12/19/24  0432 12/20/24  0526    138   K 4.1 4.1    110   CO2 20* 19*   * 122*   BUN 8 13   CREATININE 0.7 0.7   CALCIUM 9.2 9.1   ANIONGAP 10 9    and CBC   Recent Labs   Lab 12/19/24  0432 12/20/24  0526   WBC 16.49* 16.82*   HGB 13.2 12.8   HCT 42.2 41.6    320     Microbiology:   Microbiology Results (last 7 days)       Procedure Component Value Units Date/Time    Respiratory Infection Panel (PCR), Nasopharyngeal [[id number]]  (Abnormal) Collected: 12/18/24 1303    Order Status: Completed Specimen: Nasopharyngeal Swab Updated: 12/18/24 1903     Respiratory Infection Panel Source NP Swab     Adenovirus Not Detected     Coronavirus 229E, Common Cold Virus Not Detected     Coronavirus HKU1, Common Cold Virus Not Detected     Coronavirus NL63, Common Cold Virus Not Detected     Coronavirus OC43, Common Cold Virus Not Detected     Comment: The Coronavirus strains detected in this test cause the common cold.  These strains are not the COVID-19 (novel Coronavirus)strain   associated with the respiratory disease outbreak.          SARS-CoV2 (COVID-19) Qualitative PCR Not Detected     Human Metapneumovirus Not Detected     Human Rhinovirus/Enterovirus Not Detected     Influenza A (subtypes H1, H1-2009,H3) Not Detected     Influenza B Not Detected     Parainfluenza Virus 1 Not Detected     Parainfluenza Virus 2 Not Detected     Parainfluenza Virus 3 Not Detected     Parainfluenza Virus 4 Not Detected     Respiratory Syncytial Virus Detected     Bordetella Parapertussis (DN7142) Not Detected     Bordetella pertussis (ptxP) Not Detected     Chlamydia pneumoniae Not Detected     Mycoplasma pneumoniae Not Detected    Narrative:      Assay not valid for lower respiratory specimens, alternate  testing required.    Culture, Respiratory with Gram Stain [[id number]]     Order Status: No result Specimen: Respiratory     Influenza A & B by Molecular [[id number]] Collected: 12/18/24 0518     Order Status: Completed Specimen: Nasopharyngeal Swab Updated: 12/18/24 0539     Influenza A, Molecular Negative     Influenza B, Molecular Negative     Flu A & B Source Nasal Swab            Radiology:     CT Chest Without Contrast [[id number]] Resulted: 12/18/24 1201   Order Status: Completed Updated: 12/18/24 1204   Narrative:     EXAMINATION:  CT CHEST WITHOUT CONTRAST    CLINICAL HISTORY:  Respiratory illness, nondiagnostic xray;    TECHNIQUE:  Low dose axial images, sagittal and coronal reformations were obtained from the thoracic inlet to the lung bases. Contrast was not administered.    COMPARISON:  Chest x-ray same day.    FINDINGS:  There is dependent discoid atelectasis at the lung bases.  No focal consolidation.  No pleural or pericardial effusions.  No suspicious endobronchial lesion.  No significant axillary or intrathoracic lymphadenopathy.    Limited evaluation of the upper abdomen demonstrates splenomegaly.   Impression:       1. Dependent discoid atelectasis at the lung bases without focal consolidation.  2. Splenomegaly.      Electronically signed by: Pj Lock  Date: 12/18/2024  Time: 12:01   X-Ray Chest AP Portable [[id number]] Resulted: 12/18/24 0443   Order Status: Completed Updated: 12/18/24 0445   Narrative:     EXAMINATION:  XR CHEST AP PORTABLE    CLINICAL HISTORY:  Cough and wheeze;    TECHNIQUE:  Single frontal view of the chest was performed.    COMPARISON:  None    FINDINGS:  No consolidation, pleural effusion or pneumothorax.    Cardiomediastinal silhouette is unremarkable.   Impression:       No acute findings in the chest.      Electronically signed by: Hiram Solomon MD  Date: 12/18/2024  Time: 04:43     Pending Diagnostic Studies:       None           Medications:  Reconciled Home Medications:      Medication List        START taking these medications      cefdinir 300 MG capsule  Commonly known as: OMNICEF  Take 1 capsule (300 mg total) by mouth 2 (two) times daily. for  10 days     guaiFENesin 100 mg/5 ml 100 mg/5 mL syrup  Commonly known as: ROBITUSSIN  Take 10 mLs (200 mg total) by mouth every 4 (four) hours as needed for Congestion.     levalbuterol 1.25 mg/0.5 mL nebulizer solution  Commonly known as: XOPENEX  Take 0.5 mLs (1.25 mg total) by nebulization every 8 (eight) hours as needed for Wheezing (shortness of breath). Rescue     methylPREDNISolone 4 mg tablet  Commonly known as: MEDROL DOSEPACK  use as directed     nicotine 7 mg/24 hr  Commonly known as: NICODERM CQ  Place 1 patch onto the skin once daily.            CONTINUE taking these medications      albuterol 90 mcg/actuation inhaler  Commonly known as: PROVENTIL/VENTOLIN HFA  Inhale 2 puffs into the lungs every 6 (six) hours as needed.              Indwelling Lines/Drains at time of discharge:   Lines/Drains/Airways       None                   Time spent on the discharge of patient: 31 minutes         Dalila Teague NP  Department of Hospital Medicine  [facility]

## 2024-12-20 NOTE — PLAN OF CARE
Problem: Adult Inpatient Plan of Care  Goal: Plan of Care Review  Outcome: Progressing  Goal: Patient-Specific Goal (Individualized)  Outcome: Progressing  Goal: Absence of Hospital-Acquired Illness or Injury  Outcome: Progressing  Goal: Optimal Comfort and Wellbeing  Outcome: Progressing  Goal: Readiness for Transition of Care  Outcome: Progressing     Problem: Infection  Goal: Absence of Infection Signs and Symptoms  Outcome: Progressing     Problem: Gas Exchange Impaired  Goal: Optimal Gas Exchange  Outcome: Progressing     Problem: Electrolyte Imbalance  Goal: Electrolyte Balance  Outcome: Progressing

## 2024-12-23 LAB
OHS QRS DURATION: 98 MS
OHS QTC CALCULATION: 450 MS

## 2024-12-28 NOTE — PHYSICIAN QUERY
Please further clarify the pneumonia diagnosis.    Viral pneumonia (specify organism if known): RSV

## 2025-03-01 ENCOUNTER — HOSPITAL ENCOUNTER (EMERGENCY)
Facility: HOSPITAL | Age: 40
Discharge: HOME | End: 2025-03-01
Attending: STUDENT IN AN ORGANIZED HEALTH CARE EDUCATION/TRAINING PROGRAM
Payer: MEDICAID

## 2025-03-01 VITALS
TEMPERATURE: 98 F | DIASTOLIC BLOOD PRESSURE: 83 MMHG | BODY MASS INDEX: 34.07 KG/M2 | SYSTOLIC BLOOD PRESSURE: 120 MMHG | WEIGHT: 169 LBS | HEIGHT: 59 IN | HEART RATE: 94 BPM | RESPIRATION RATE: 20 BRPM | OXYGEN SATURATION: 98 %

## 2025-03-01 DIAGNOSIS — R10.32 LEFT LOWER QUADRANT ABDOMINAL PAIN: Primary | ICD-10-CM

## 2025-03-01 DIAGNOSIS — K52.9 COLITIS: ICD-10-CM

## 2025-03-01 LAB
ALBUMIN SERPL BCP-MCNC: 3.5 G/DL (ref 3.5–5.2)
ALP SERPL-CCNC: 96 U/L (ref 40–150)
ALT SERPL W/O P-5'-P-CCNC: 13 U/L (ref 10–44)
ANION GAP SERPL CALC-SCNC: 14 MMOL/L (ref 8–16)
AST SERPL-CCNC: 16 U/L (ref 10–40)
B-HCG UR QL: NEGATIVE
BASOPHILS # BLD AUTO: 0.02 K/UL (ref 0–0.2)
BASOPHILS NFR BLD: 0.3 % (ref 0–1.9)
BILIRUB SERPL-MCNC: 0.3 MG/DL (ref 0.1–1)
BILIRUB UR QL STRIP: NEGATIVE
BUN SERPL-MCNC: 7 MG/DL (ref 6–20)
CALCIUM SERPL-MCNC: 9.2 MG/DL (ref 8.7–10.5)
CHLORIDE SERPL-SCNC: 105 MMOL/L (ref 95–110)
CLARITY UR: CLEAR
CO2 SERPL-SCNC: 21 MMOL/L (ref 23–29)
COLOR UR: YELLOW
CREAT SERPL-MCNC: 0.8 MG/DL (ref 0.5–1.4)
DIFFERENTIAL METHOD BLD: ABNORMAL
EOSINOPHIL # BLD AUTO: 0 K/UL (ref 0–0.5)
EOSINOPHIL NFR BLD: 0.3 % (ref 0–8)
ERYTHROCYTE [DISTWIDTH] IN BLOOD BY AUTOMATED COUNT: 13.2 % (ref 11.5–14.5)
EST. GFR  (NO RACE VARIABLE): >60 ML/MIN/1.73 M^2
GLUCOSE SERPL-MCNC: 76 MG/DL (ref 70–110)
GLUCOSE UR QL STRIP: NEGATIVE
HCT VFR BLD AUTO: 43 % (ref 37–48.5)
HGB BLD-MCNC: 13.9 G/DL (ref 12–16)
HGB UR QL STRIP: NEGATIVE
IMM GRANULOCYTES # BLD AUTO: 0.02 K/UL (ref 0–0.04)
IMM GRANULOCYTES NFR BLD AUTO: 0.3 % (ref 0–0.5)
KETONES UR QL STRIP: NEGATIVE
LEUKOCYTE ESTERASE UR QL STRIP: NEGATIVE
LIPASE SERPL-CCNC: 11 U/L (ref 4–60)
LYMPHOCYTES # BLD AUTO: 1 K/UL (ref 1–4.8)
LYMPHOCYTES NFR BLD: 16.9 % (ref 18–48)
MCH RBC QN AUTO: 28.2 PG (ref 27–31)
MCHC RBC AUTO-ENTMCNC: 32.3 G/DL (ref 32–36)
MCV RBC AUTO: 87 FL (ref 82–98)
MONOCYTES # BLD AUTO: 0.7 K/UL (ref 0.3–1)
MONOCYTES NFR BLD: 10.8 % (ref 4–15)
NEUTROPHILS # BLD AUTO: 4.4 K/UL (ref 1.8–7.7)
NEUTROPHILS NFR BLD: 71.4 % (ref 38–73)
NITRITE UR QL STRIP: NEGATIVE
NRBC BLD-RTO: 0 /100 WBC
OB PNL STL: POSITIVE
PH UR STRIP: 6 [PH] (ref 5–8)
PLATELET # BLD AUTO: 240 K/UL (ref 150–450)
PMV BLD AUTO: 10.6 FL (ref 9.2–12.9)
POTASSIUM SERPL-SCNC: 3.6 MMOL/L (ref 3.5–5.1)
PROT SERPL-MCNC: 6.8 G/DL (ref 6–8.4)
PROT UR QL STRIP: NEGATIVE
RBC # BLD AUTO: 4.93 M/UL (ref 4–5.4)
SODIUM SERPL-SCNC: 140 MMOL/L (ref 136–145)
SP GR UR STRIP: 1.01 (ref 1–1.03)
URN SPEC COLLECT METH UR: NORMAL
UROBILINOGEN UR STRIP-ACNC: NEGATIVE EU/DL
WBC # BLD AUTO: 6.11 K/UL (ref 3.9–12.7)

## 2025-03-01 PROCEDURE — 25000003 PHARM REV CODE 250: Mod: UD | Performed by: STUDENT IN AN ORGANIZED HEALTH CARE EDUCATION/TRAINING PROGRAM

## 2025-03-01 PROCEDURE — 36415 COLL VENOUS BLD VENIPUNCTURE: CPT | Performed by: STUDENT IN AN ORGANIZED HEALTH CARE EDUCATION/TRAINING PROGRAM

## 2025-03-01 PROCEDURE — 83690 ASSAY OF LIPASE: CPT | Performed by: STUDENT IN AN ORGANIZED HEALTH CARE EDUCATION/TRAINING PROGRAM

## 2025-03-01 PROCEDURE — 85025 COMPLETE CBC W/AUTO DIFF WBC: CPT | Performed by: STUDENT IN AN ORGANIZED HEALTH CARE EDUCATION/TRAINING PROGRAM

## 2025-03-01 PROCEDURE — 96361 HYDRATE IV INFUSION ADD-ON: CPT

## 2025-03-01 PROCEDURE — 99285 EMERGENCY DEPT VISIT HI MDM: CPT | Mod: 25

## 2025-03-01 PROCEDURE — 96375 TX/PRO/DX INJ NEW DRUG ADDON: CPT

## 2025-03-01 PROCEDURE — 74177 CT ABD & PELVIS W/CONTRAST: CPT | Mod: TC

## 2025-03-01 PROCEDURE — 81003 URINALYSIS AUTO W/O SCOPE: CPT | Performed by: STUDENT IN AN ORGANIZED HEALTH CARE EDUCATION/TRAINING PROGRAM

## 2025-03-01 PROCEDURE — 25500020 PHARM REV CODE 255: Performed by: STUDENT IN AN ORGANIZED HEALTH CARE EDUCATION/TRAINING PROGRAM

## 2025-03-01 PROCEDURE — 63600175 PHARM REV CODE 636 W HCPCS: Mod: UD | Performed by: STUDENT IN AN ORGANIZED HEALTH CARE EDUCATION/TRAINING PROGRAM

## 2025-03-01 PROCEDURE — 81025 URINE PREGNANCY TEST: CPT | Performed by: STUDENT IN AN ORGANIZED HEALTH CARE EDUCATION/TRAINING PROGRAM

## 2025-03-01 PROCEDURE — 82272 OCCULT BLD FECES 1-3 TESTS: CPT | Performed by: STUDENT IN AN ORGANIZED HEALTH CARE EDUCATION/TRAINING PROGRAM

## 2025-03-01 PROCEDURE — 80053 COMPREHEN METABOLIC PANEL: CPT | Performed by: STUDENT IN AN ORGANIZED HEALTH CARE EDUCATION/TRAINING PROGRAM

## 2025-03-01 PROCEDURE — 94760 N-INVAS EAR/PLS OXIMETRY 1: CPT

## 2025-03-01 PROCEDURE — 96374 THER/PROPH/DIAG INJ IV PUSH: CPT

## 2025-03-01 RX ORDER — MORPHINE SULFATE 2 MG/ML
6 INJECTION, SOLUTION INTRAMUSCULAR; INTRAVENOUS
Refills: 0 | Status: COMPLETED | OUTPATIENT
Start: 2025-03-01 | End: 2025-03-01

## 2025-03-01 RX ORDER — TRAMADOL HYDROCHLORIDE 50 MG/1
50 TABLET ORAL EVERY 6 HOURS PRN
Qty: 12 TABLET | Refills: 0 | Status: SHIPPED | OUTPATIENT
Start: 2025-03-01

## 2025-03-01 RX ORDER — ONDANSETRON HYDROCHLORIDE 2 MG/ML
4 INJECTION, SOLUTION INTRAVENOUS
Status: COMPLETED | OUTPATIENT
Start: 2025-03-01 | End: 2025-03-01

## 2025-03-01 RX ADMIN — MORPHINE SULFATE 6 MG: 2 INJECTION, SOLUTION INTRAMUSCULAR; INTRAVENOUS at 05:03

## 2025-03-01 RX ADMIN — IOHEXOL 75 ML: 350 INJECTION, SOLUTION INTRAVENOUS at 06:03

## 2025-03-01 RX ADMIN — ONDANSETRON 4 MG: 2 INJECTION INTRAMUSCULAR; INTRAVENOUS at 05:03

## 2025-03-01 RX ADMIN — SODIUM CHLORIDE 1000 ML: 9 INJECTION, SOLUTION INTRAVENOUS at 04:03

## 2025-03-01 NOTE — ED NOTES
"Pt drove self to hospital and reports she can get a ride home if pain meds are given, pt states " yes ma'am"   "

## 2025-03-01 NOTE — ED PROVIDER NOTES
Encounter Date: 3/1/2025       History     Chief Complaint   Patient presents with    Abdominal Pain     Patient reports LLQ abdominal pain present intermittently since last night.  Light headed and diaphoretic when using the toilet last night.  This morning patient noted bright red blood in toilet and on toilet paper.  No nausea.  No vomiting.       40-year-old female with a significant history of hypertension, anemia, asthma, anxiety, heavy menstrual bleeding status post ablation.  She presents to ED with chief complaints of left lower quadrant abdominal pain since yesterday   She also reports lightheadedness, and bright red blood around her stool after bowel movement this morning was well as bright red blood on the toilet paper when she wiped after bowel movement this morning. The toilet bowl was not filled with blood. She tells times he the stool was soft.   She denies lightheadedness at this time, denies chest pain, palpitations, or shortness of breath.    The history is provided by the patient. No  was used.     Review of patient's allergies indicates:   Allergen Reactions    Prednisone Hives     Past Medical History:   Diagnosis Date    Anemia     Asthma     Heavy menstrual bleeding     Hypertension     no meds    Mental disorder     anxiety/depression     Past Surgical History:   Procedure Laterality Date    CARPAL TUNNEL RELEASE Bilateral     ENDOMETRIAL ABLATION N/A 10/1/2024    Procedure: ABLATION, ENDOMETRIUM;  Surgeon: Alisia Collins MD;  Location: [facility] OR;  Service: OB/GYN;  Laterality: N/A;    HYSTEROSCOPY WITH DILATION AND CURETTAGE OF UTERUS N/A 10/1/2024    Procedure: HYSTEROSCOPY, WITH DILATION AND CURETTAGE OF UTERUS;  Surgeon: Alisia Collins MD;  Location: [facility] OR;  Service: OB/GYN;  Laterality: N/A;    TUBAL LIGATION      PPTL     Family History   Problem Relation Name Age of Onset    Breast cancer Neg Hx      Colon cancer Neg Hx      Ovarian cancer Neg Hx      Uterine  cancer Neg Hx       Social History[1]  Review of Systems   Constitutional: Negative.    HENT: Negative.     Eyes: Negative.    Respiratory: Negative.     Cardiovascular: Negative.    Gastrointestinal:  Positive for blood in stool.   Endocrine: Negative.    Genitourinary: Negative.    Musculoskeletal: Negative.    Neurological: Negative.    All other systems reviewed and are negative.      Physical Exam     Initial Vitals [03/01/25 1556]   BP Pulse Resp Temp SpO2   (!) 162/102 (!) 123 16 98 °F (36.7 °C) 99 %      MAP       --         Physical Exam    Nursing note and vitals reviewed.  Constitutional: She appears well-developed.   HENT:   Head: Normocephalic.   Eyes: Pupils are equal, round, and reactive to light.   Neck: No JVD present.   Normal range of motion.  Cardiovascular:  Normal rate.           Pulmonary/Chest: Breath sounds normal. No respiratory distress. She has no wheezes. She has no rales.   Abdominal: Abdomen is soft. Bowel sounds are normal. She exhibits no distension. There is abdominal tenderness (Left lower quadrant). There is no rebound and no guarding.   Musculoskeletal:         General: Normal range of motion.      Cervical back: Normal range of motion.     Neurological: She is alert and oriented to person, place, and time. She has normal strength. GCS score is 15. GCS eye subscore is 4. GCS verbal subscore is 5. GCS motor subscore is 6.   Skin: Skin is warm. Capillary refill takes less than 2 seconds.         ED Course   Procedures  Labs Reviewed   CBC W/ AUTO DIFFERENTIAL - Abnormal       Result Value    WBC 6.11      RBC 4.93      Hemoglobin 13.9      Hematocrit 43.0      MCV 87      MCH 28.2      MCHC 32.3      RDW 13.2      Platelets 240      MPV 10.6      Immature Granulocytes 0.3      Gran # (ANC) 4.4      Immature Grans (Abs) 0.02      Lymph # 1.0      Mono # 0.7      Eos # 0.0      Baso # 0.02      nRBC 0      Gran % 71.4      Lymph % 16.9 (*)     Mono % 10.8      Eosinophil % 0.3       Basophil % 0.3      Differential Method Automated     COMPREHENSIVE METABOLIC PANEL - Abnormal    Sodium 140      Potassium 3.6      Chloride 105      CO2 21 (*)     Glucose 76      BUN 7      Creatinine 0.8      Calcium 9.2      Total Protein 6.8      Albumin 3.5      Total Bilirubin 0.3      Alkaline Phosphatase 96      AST 16      ALT 13      eGFR >60.0      Anion Gap 14     OCCULT BLOOD X 1, STOOL - Abnormal    Occult Blood Positive (*)    LIPASE    Lipase 11     URINALYSIS, REFLEX TO URINE CULTURE    Specimen UA Urine, Unspecified      Color, UA Yellow      Appearance, UA Clear      pH, UA 6.0      Specific Gravity, UA 1.015      Protein, UA Negative      Glucose, UA Negative      Ketones, UA Negative      Bilirubin (UA) Negative      Occult Blood UA Negative      Nitrite, UA Negative      Urobilinogen, UA Negative      Leukocytes, UA Negative      Narrative:     Preferred Collection Type->Urine, Clean Catch  Specimen Source->Urine   PREGNANCY TEST, URINE RAPID    Preg Test, Ur Negative      Narrative:     Specimen Source->Urine          Imaging Results              CT Abdomen Pelvis With IV Contrast NO Oral Contrast (Final result)  Result time 03/01/25 19:31:50      Final result by Jt Bazan MD (03/01/25 19:31:50)                   Impression:      Mild infectious/inflammatory colitis involving the descending colon.  Underlying ischemia not excluded.    Small fat containing umbilical hernia. Mild associated fat stranding.  Correlate for incarceration    Indeterminate 25 mm right adrenal nodule. Follow-up nonemergent CT or MR adrenal mass protocol      Electronically signed by: Jt Bazan  Date:    03/01/2025  Time:    19:31               Narrative:    EXAMINATION:  CT ABDOMEN PELVIS WITH IV CONTRAST    CLINICAL HISTORY:  GI bleed;LLQ abdominal pain;    TECHNIQUE:  Low dose axial images, sagittal and coronal reformations were obtained from the lung bases to the pubic symphysis following the  IV administration of 75 mL of Omnipaque 350 .  Oral contrast was not given.    COMPARISON:  None.    FINDINGS:  Soft tissues: Small fat containing umbilical hernia.  Mild associated fat stranding.    Bones: No acute osseous abnormality.    Lower chest: Normal heart size. No pericardial effusion.    Lung Bases: Well aerated, without consolidation or pleural fluid.    Liver: Normal in size and attenuation, with no focal hepatic lesions.    Gallbladder: No calcified gallstones.    Bile Ducts: No evidence of dilated ducts.    Pancreas: No mass or peripancreatic fat stranding.    Spleen: Unremarkable.    Adrenals: Indeterminate 25 mm right adrenal nodule.  Unremarkable left adrenal gland.  Follow-up nonemergent CT or MR adrenal mass protocol    Kidneys/ Ureters: Unremarkable.    Bladder: No evidence of wall thickening.    Reproductive organs: Unremarkable.    GI Tract/Mesentery: Mild infectious/inflammatory colitis involving the descending colon.  No small bowel obstruction.    Peritoneal Space: No ascites. No free air.    Lymphadenopathy: No significant adenopathy.    Vasculature: No significant atherosclerosis or aneurysm.                                       Medications   sodium chloride 0.9% bolus 1,000 mL 1,000 mL (0 mLs Intravenous Stopped 3/1/25 1714)   morphine injection 6 mg (6 mg Intravenous Given 3/1/25 1708)   ondansetron injection 4 mg (4 mg Intravenous Given 3/1/25 1708)   iohexoL (OMNIPAQUE 350) injection 75 mL (75 mLs Intravenous Given 3/1/25 1807)     Medical Decision Making  Ddx UTI, pyelonephritis, diverticular bleed, others  She had she says slightly tender in the left lower quadrant otherwise no peritoneal sign  Screening labs CBC CMP UA unremarkable  CT abdomen pelvis pending at shift change  Patient signed over to oncoming attending at 6:00 p.m. who follow up with CT read and make disposition    Lab work reveals no evidence of acute pathology.  CT shows colitis.  There is no elevated white blood  cell count or left shift noted.  We will discharge patient home with pain medicine and steroids and have her follow up with her primary care provider.    Amount and/or Complexity of Data Reviewed  Labs: ordered.  Radiology: ordered.    Risk  Prescription drug management.                                      Clinical Impression:  Final diagnoses:  [R10.32] Left lower quadrant abdominal pain (Primary)  [K52.9] Colitis          ED Disposition Condition    Discharge Stable          ED Prescriptions       Medication Sig Dispense Start Date End Date Auth. Provider    traMADoL (ULTRAM) 50 mg tablet Take 1 tablet (50 mg total) by mouth every 6 (six) hours as needed for Pain. 12 tablet 3/1/2025 -- Prabhjot Riggins,           Follow-up Information       Follow up With Specialties Details Why Contact Info    [facility] Schedule an appointment as soon as possible for a visit   [address] [id number] [phone]                 [1]   Social History  Tobacco Use    Smoking status: Every Day     Types: Cigarettes    Smokeless tobacco: Never   Substance Use Topics    Alcohol use: Not Currently    Drug use: Never        Prabhjot Riggins DO  03/01/25 2017

## 2025-03-01 NOTE — ED TRIAGE NOTES
"Present with c/o " last night I started having abdominal pain" reports awakening up this am with lower abd pain, reports sharp pain to " lower part of my stomach"     Pt reports 2 episodes of having a BM and noticing bright red blood on toilet paper, once episode last night and once this am    Currently pt rates pain to lower abd 9/10, denies n/v/d  "

## 2025-03-01 NOTE — Clinical Note
"Yenny HENSLEY" Case was seen and treated in our emergency department on 3/1/2025.  She may return to work on 03/01/2025.       If you have any questions or concerns, please don't hesitate to call.      SEBASTIAN Oakes    "

## 2025-03-02 NOTE — RESPIRATORY THERAPY
03/01/25 1824   Patient Assessment/Suction   Level of Consciousness (AVPU) alert   Respiratory Effort Normal;Unlabored   Expansion/Accessory Muscles/Retractions no retractions;no use of accessory muscles   Rhythm/Pattern, Respiratory depth regular;pattern regular;unlabored   Cough Frequency no cough   PRE-TX-O2   Device (Oxygen Therapy) room air   SpO2 99 %   Pulse Oximetry Type Continuous   $ Pulse Oximetry - Single Charge Pulse Oximetry - Single   Pulse 60   Resp 18

## 2025-03-02 NOTE — DISCHARGE INSTRUCTIONS
Take your medication as directed.  Clear liquids for the next 24 hours.  Follow up with your primary care provider.

## (undated) DEVICE — SEE MEDLINE ITEM 157181

## (undated) DEVICE — TRAY CATH INTMIT POLY 14FR

## (undated) DEVICE — PAD CURITY MATERNITY PERI

## (undated) DEVICE — GLOVE SENSICARE PI SURG 6

## (undated) DEVICE — KIT NOVASURE V5 ENDOMET ABLAT

## (undated) DEVICE — CANISTER SUCTION RIGID 3000CC

## (undated) DEVICE — TRAY SKIN SCRUB WET 4 COMPART

## (undated) DEVICE — PAD N ADH STRL 2X3IN

## (undated) DEVICE — SEAL LENS SCOPE MYOSURE

## (undated) DEVICE — Device

## (undated) DEVICE — GOWN POLY REINF BRTH SLV LG

## (undated) DEVICE — SPONGE GAUZE 16PLY 4X4

## (undated) DEVICE — GLOVE SENSICARE PI SURG 6.5

## (undated) DEVICE — GLOVE SENSICARE PI SURG 7

## (undated) DEVICE — LEGGING CLEAR POLY 2/PACK

## (undated) DEVICE — PAD CURAD NONADH 3X4IN

## (undated) DEVICE — DRAPE THREE-QUARTER 53X77IN

## (undated) DEVICE — PACK FLUENT DISPOSABLE

## (undated) DEVICE — BRIEF MESH LARGE